# Patient Record
Sex: FEMALE | Race: WHITE | NOT HISPANIC OR LATINO | Employment: OTHER | URBAN - METROPOLITAN AREA
[De-identification: names, ages, dates, MRNs, and addresses within clinical notes are randomized per-mention and may not be internally consistent; named-entity substitution may affect disease eponyms.]

---

## 2019-09-11 ENCOUNTER — OFFICE VISIT (OUTPATIENT)
Dept: FAMILY MEDICINE CLINIC | Facility: CLINIC | Age: 84
End: 2019-09-11

## 2019-09-11 VITALS
DIASTOLIC BLOOD PRESSURE: 82 MMHG | HEART RATE: 76 BPM | OXYGEN SATURATION: 96 % | SYSTOLIC BLOOD PRESSURE: 140 MMHG | TEMPERATURE: 97.9 F | WEIGHT: 144.6 LBS | BODY MASS INDEX: 25.62 KG/M2 | HEIGHT: 63 IN

## 2019-09-11 DIAGNOSIS — R53.83 FATIGUE, UNSPECIFIED TYPE: ICD-10-CM

## 2019-09-11 DIAGNOSIS — E53.8 VITAMIN B12 DEFICIENCY: ICD-10-CM

## 2019-09-11 DIAGNOSIS — G89.29 OTHER CHRONIC PAIN: Primary | ICD-10-CM

## 2019-09-11 DIAGNOSIS — Z79.899 HIGH RISK MEDICATION USE: ICD-10-CM

## 2019-09-11 DIAGNOSIS — I10 ESSENTIAL HYPERTENSION: ICD-10-CM

## 2019-09-11 PROCEDURE — 99214 OFFICE O/P EST MOD 30 MIN: CPT | Performed by: FAMILY MEDICINE

## 2019-09-11 RX ORDER — ERYTHROMYCIN 5 MG/G
OINTMENT OPHTHALMIC
Refills: 3 | COMMUNITY
Start: 2019-08-03 | End: 2020-01-01

## 2019-09-11 RX ORDER — PANTOPRAZOLE SODIUM 40 MG/1
40 TABLET, DELAYED RELEASE ORAL DAILY
Refills: 5 | COMMUNITY
Start: 2019-09-05 | End: 2020-01-01 | Stop reason: SDUPTHER

## 2019-09-11 RX ORDER — PROPRANOLOL HYDROCHLORIDE 10 MG/1
5 TABLET ORAL 2 TIMES DAILY
COMMUNITY
End: 2019-09-11 | Stop reason: SINTOL

## 2019-09-11 RX ORDER — HYDROCODONE BITARTRATE AND ACETAMINOPHEN 7.5; 325 MG/1; MG/1
1 TABLET ORAL EVERY 6 HOURS PRN
Refills: 0 | COMMUNITY
Start: 2019-08-09 | End: 2019-09-11 | Stop reason: SDUPTHER

## 2019-09-11 RX ORDER — HYDROCODONE BITARTRATE AND ACETAMINOPHEN 7.5; 325 MG/1; MG/1
1 TABLET ORAL EVERY 6 HOURS PRN
Qty: 120 TABLET | Refills: 0 | Status: SHIPPED | OUTPATIENT
Start: 2019-09-11 | End: 2019-10-10 | Stop reason: SDUPTHER

## 2019-09-11 RX ORDER — METOPROLOL SUCCINATE 25 MG/1
12.5 TABLET, EXTENDED RELEASE ORAL DAILY
Qty: 45 TABLET | Refills: 3 | Status: SHIPPED | OUTPATIENT
Start: 2019-09-11 | End: 2019-12-16 | Stop reason: SDUPTHER

## 2019-09-11 NOTE — PROGRESS NOTES
Subjective   Ivis Yin is a 83 y.o. female.     Chief Complaint   Patient presents with   • Pain   • Heartburn     wants PCP to take over meds instead og GI        Patient doing well with her chronic pain management.  She gets through the day.  The propanolol did not help her tremor at the higher dose and it just makes her sleepy.  They are wondering about trying something else for her blood pressure.  She denies any chest pain or shortness of breath.  She does sleep a lot but can stay awake for visits.  She is meeting her ADLs and just needs some help at home.           The following portions of the patient's history were reviewed and updated as appropriate: allergies, current medications, past family history, past medical history, past social history, past surgical history and problem list.    Past Medical History:   Diagnosis Date   • Abdominal pain    • Abnormal blood chemistry    • Abnormal creatine kinase level    • Abnormal thyroid screen (blood)    • Adjustment disorder with mixed anxiety and depressed mood    • Anxiety disorder    • Arthritis    • Benign familial tremor    • BMI 27.0-27.9,adult    • Cervical stenosis of spine    • Chronic pain    • Constipation    • COPD (chronic obstructive pulmonary disease) (CMS/HCC)    • Depression    • Dermatitis    • Dermatophytosis    • Dizziness    • Dyspnea    • Edema    • Elevated hematocrit    • Encounter for long-term (current) use of medications    • Esophageal reflux    • Extremity atherosclerosis with intermittent claudication (CMS/HCC)    • Fatigue    • Generalized osteoarthritis    • Hyperlipidemia    • Hypertension    • Hypokalemia    • Hypothyroidism    • Impacted cerumen    • Insomnia    • Limb pain    • Limb swelling    • Loss of weight    • Mild renal insufficiency    • Mumps    • Muscle cramp    • Muscle weakness    • Nausea    • Osteoporosis    • Peripheral vertigo    • Seborrheic keratosis    • Statin myopathy    • Tinnitus    • Tobacco use     • Tremor    • Varicella    • Vitamin B12 deficiency    • Vitamin D deficiency    • Weakness        Past Surgical History:   Procedure Laterality Date   • APPENDECTOMY  1959   • BLADDER SURGERY  1985    bladder repair   • CATARACT EXTRACTION     • COLONOSCOPY  2007   • HERNIA REPAIR      1995, 2000, 2005, 2010, 2/2012   • HYSTERECTOMY     • INGUINAL HERNIA REPAIR     • SHOULDER SURGERY  08/28/2013       Family History   Problem Relation Age of Onset   • Emphysema Mother    • COPD Mother    • Osteoporosis Mother    • Cancer Father        Social History     Socioeconomic History   • Marital status:      Spouse name: Not on file   • Number of children: Not on file   • Years of education: Not on file   • Highest education level: Not on file   Tobacco Use   • Smoking status: Current Every Day Smoker   • Smokeless tobacco: Current User   • Tobacco comment: smokes 1-2 PPD for 30 years (10/4/18)   Substance and Sexual Activity   • Alcohol use: No     Frequency: Never   • Drug use: No         Current Outpatient Medications:   •  erythromycin (ROMYCIN) 5 MG/GM ophthalmic ointment, ONE APPLICATION IN THE RIGHT EYE IN THE EVENING, Disp: , Rfl: 3  •  HYDROcodone-acetaminophen (NORCO) 7.5-325 MG per tablet, Take 1 tablet by mouth Every 6 (Six) Hours As Needed for Moderate Pain  or Severe Pain . for pain, Disp: 120 tablet, Rfl: 0  •  pantoprazole (PROTONIX) 40 MG EC tablet, Take 40 mg by mouth Daily., Disp: , Rfl: 5  •  metoprolol succinate XL (TOPROL XL) 25 MG 24 hr tablet, Take 0.5 tablets by mouth Daily., Disp: 45 tablet, Rfl: 3    Review of Systems   Constitutional: Negative for chills, fatigue and fever.   HENT: Negative for congestion, rhinorrhea and sore throat.    Respiratory: Negative for cough and shortness of breath.    Cardiovascular: Negative for chest pain and leg swelling.   Gastrointestinal: Negative for abdominal pain.   Endocrine: Negative for polydipsia and polyuria.   Genitourinary: Negative for  "dysuria.   Musculoskeletal: Positive for arthralgias and back pain. Negative for myalgias.   Skin: Negative for rash.   Neurological: Negative for dizziness.   Hematological: Does not bruise/bleed easily.   Psychiatric/Behavioral: Negative for sleep disturbance.       Objective   Vitals:    09/11/19 1443   BP: 140/82   Pulse: 76   Temp: 97.9 °F (36.6 °C)   SpO2: 96%   Weight: 65.6 kg (144 lb 9.6 oz)   Height: 160 cm (63\")     Body mass index is 25.61 kg/m².  Physical Exam   Constitutional: She is oriented to person, place, and time. She appears well-developed and well-nourished.   HENT:   Head: Normocephalic and atraumatic.   Eyes: Conjunctivae and EOM are normal. Pupils are equal, round, and reactive to light.   Neck: Neck supple. No thyromegaly present.   Cardiovascular: Normal rate and regular rhythm.   No murmur heard.  Pulmonary/Chest: Effort normal and breath sounds normal. She has no wheezes.   Abdominal: Soft.   Musculoskeletal: Normal range of motion.   Neurological: She is alert and oriented to person, place, and time. No cranial nerve deficit.   Tremor noted   Skin: Skin is warm and dry.   Psychiatric: She has a normal mood and affect.         Assessment/Plan   Ivis was seen today for pain and heartburn.    Diagnoses and all orders for this visit:    Other chronic pain    Essential hypertension  -     metoprolol succinate XL (TOPROL XL) 25 MG 24 hr tablet; Take 0.5 tablets by mouth Daily.    High risk medication use  -     Drug Profile [id number] - Urine, Clean Catch    Vitamin B12 deficiency  -     Vitamin B12    Fatigue, unspecified type  -     CBC & Differential  -     Comprehensive Metabolic Panel  -     TSH    Other orders  -     HYDROcodone-acetaminophen (NORCO) 7.5-325 MG per tablet; Take 1 tablet by mouth Every 6 (Six) Hours As Needed for Moderate Pain  or Severe Pain . for pain               There are no Patient Instructions on file for this visit.  "

## 2019-09-12 LAB
ALBUMIN SERPL-MCNC: 4.4 G/DL (ref 3.5–4.7)
ALBUMIN/GLOB SERPL: 2 {RATIO} (ref 1.2–2.2)
ALP SERPL-CCNC: 81 IU/L (ref 39–117)
ALT SERPL-CCNC: 5 IU/L (ref 0–32)
AST SERPL-CCNC: 6 IU/L (ref 0–40)
BASOPHILS # BLD AUTO: 0 X10E3/UL (ref 0–0.2)
BASOPHILS NFR BLD AUTO: 1 %
BILIRUB SERPL-MCNC: 0.3 MG/DL (ref 0–1.2)
BUN SERPL-MCNC: 16 MG/DL (ref 8–27)
BUN/CREAT SERPL: 14 (ref 12–28)
CALCIUM SERPL-MCNC: 9.3 MG/DL (ref 8.7–10.3)
CHLORIDE SERPL-SCNC: 103 MMOL/L (ref 96–106)
CO2 SERPL-SCNC: 26 MMOL/L (ref 20–29)
CREAT SERPL-MCNC: 1.12 MG/DL (ref 0.57–1)
EOSINOPHIL # BLD AUTO: 0.3 X10E3/UL (ref 0–0.4)
EOSINOPHIL NFR BLD AUTO: 5 %
ERYTHROCYTE [DISTWIDTH] IN BLOOD BY AUTOMATED COUNT: 13.5 % (ref 12.3–15.4)
GLOBULIN SER CALC-MCNC: 2.2 G/DL (ref 1.5–4.5)
GLUCOSE SERPL-MCNC: 105 MG/DL (ref 65–99)
HCT VFR BLD AUTO: 46.1 % (ref 34–46.6)
HGB BLD-MCNC: 15.5 G/DL (ref 11.1–15.9)
IMM GRANULOCYTES # BLD AUTO: 0 X10E3/UL (ref 0–0.1)
IMM GRANULOCYTES NFR BLD AUTO: 0 %
LYMPHOCYTES # BLD AUTO: 2.3 X10E3/UL (ref 0.7–3.1)
LYMPHOCYTES NFR BLD AUTO: 32 %
MCH RBC QN AUTO: 31.4 PG (ref 26.6–33)
MCHC RBC AUTO-ENTMCNC: 33.6 G/DL (ref 31.5–35.7)
MCV RBC AUTO: 93 FL (ref 79–97)
MONOCYTES # BLD AUTO: 0.4 X10E3/UL (ref 0.1–0.9)
MONOCYTES NFR BLD AUTO: 6 %
NEUTROPHILS # BLD AUTO: 4.1 X10E3/UL (ref 1.4–7)
NEUTROPHILS NFR BLD AUTO: 56 %
PLATELET # BLD AUTO: 212 X10E3/UL (ref 150–450)
POTASSIUM SERPL-SCNC: 4.3 MMOL/L (ref 3.5–5.2)
PROT SERPL-MCNC: 6.6 G/DL (ref 6–8.5)
RBC # BLD AUTO: 4.94 X10E6/UL (ref 3.77–5.28)
SODIUM SERPL-SCNC: 146 MMOL/L (ref 134–144)
TSH SERPL DL<=0.005 MIU/L-ACNC: 3.95 UIU/ML (ref 0.45–4.5)
VIT B12 SERPL-MCNC: 526 PG/ML (ref 232–1245)
WBC # BLD AUTO: 7.1 X10E3/UL (ref 3.4–10.8)

## 2019-09-13 LAB
AMPHETAMINES UR QL SCN: NEGATIVE NG/ML
BARBITURATES UR QL SCN: NEGATIVE NG/ML
BENZODIAZ UR QL SCN: NEGATIVE NG/ML
BZE UR QL SCN: NEGATIVE NG/ML
CANNABINOIDS UR QL SCN: NEGATIVE NG/ML
CREAT UR-MCNC: 182.4 MG/DL (ref 20–300)
LABORATORY COMMENT REPORT: ABNORMAL
METHADONE UR QL SCN: NEGATIVE NG/ML
OPIATES UR QL SCN: POSITIVE NG/ML
OXYCODONE+OXYMORPHONE UR QL SCN: NEGATIVE NG/ML
PCP UR QL: NEGATIVE NG/ML
PH UR: 5.7 [PH] (ref 4.5–8.9)
PROPOXYPH UR QL SCN: NEGATIVE NG/ML

## 2019-10-10 ENCOUNTER — TELEPHONE (OUTPATIENT)
Dept: FAMILY MEDICINE CLINIC | Facility: CLINIC | Age: 84
End: 2019-10-10

## 2019-10-10 DIAGNOSIS — G89.29 OTHER CHRONIC PAIN: Primary | ICD-10-CM

## 2019-10-10 RX ORDER — HYDROCODONE BITARTRATE AND ACETAMINOPHEN 7.5; 325 MG/1; MG/1
1 TABLET ORAL EVERY 6 HOURS PRN
Qty: 120 TABLET | Refills: 0 | Status: SHIPPED | OUTPATIENT
Start: 2019-10-10 | End: 2019-10-15 | Stop reason: SDUPTHER

## 2019-10-10 NOTE — TELEPHONE ENCOUNTER
Needs a refill on hydrocodone, everything is up to date. She has been seen at this office. I didn't pend the medication.

## 2019-10-11 ENCOUNTER — TELEPHONE (OUTPATIENT)
Dept: FAMILY MEDICINE CLINIC | Facility: CLINIC | Age: 84
End: 2019-10-11

## 2019-10-11 DIAGNOSIS — G89.29 OTHER CHRONIC PAIN: ICD-10-CM

## 2019-10-14 NOTE — TELEPHONE ENCOUNTER
I can not figure out how to send in medication from the [facility] but i can co-sign them. OK for refill.

## 2019-10-15 RX ORDER — HYDROCODONE BITARTRATE AND ACETAMINOPHEN 7.5; 325 MG/1; MG/1
1 TABLET ORAL EVERY 6 HOURS PRN
Qty: 120 TABLET | Refills: 0 | Status: SHIPPED | OUTPATIENT
Start: 2019-10-15 | End: 2019-11-15 | Stop reason: SDUPTHER

## 2019-10-16 RX ORDER — PROPRANOLOL HYDROCHLORIDE 10 MG/1
TABLET ORAL
Qty: 60 TABLET | Refills: 0 | OUTPATIENT
Start: 2019-10-16

## 2019-10-16 NOTE — TELEPHONE ENCOUNTER
Clarify.  I see metoprolol in the patient's medicine list.  She should only be taking 1 beta-blocker.

## 2019-10-16 NOTE — TELEPHONE ENCOUNTER
I think maybe you just started the metoprolol at the recent visit. But it was started for HTN, in the note it states the propanolol was for tremor? Please advise.

## 2019-10-23 ENCOUNTER — OFFICE VISIT (OUTPATIENT)
Dept: FAMILY MEDICINE CLINIC | Facility: CLINIC | Age: 84
End: 2019-10-23

## 2019-10-23 VITALS
SYSTOLIC BLOOD PRESSURE: 128 MMHG | DIASTOLIC BLOOD PRESSURE: 76 MMHG | HEART RATE: 80 BPM | TEMPERATURE: 98.1 F | OXYGEN SATURATION: 97 % | BODY MASS INDEX: 25.16 KG/M2 | HEIGHT: 63 IN | WEIGHT: 142 LBS

## 2019-10-23 DIAGNOSIS — R30.0 DYSURIA: Primary | ICD-10-CM

## 2019-10-23 DIAGNOSIS — R41.0 DISORIENTATION: ICD-10-CM

## 2019-10-23 DIAGNOSIS — N30.00 ACUTE CYSTITIS WITHOUT HEMATURIA: ICD-10-CM
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
## 2019-10-23 LAB
BILIRUB BLD-MCNC: NEGATIVE MG/DL
GLUCOSE UR STRIP-MCNC: NEGATIVE MG/DL
KETONES UR QL: NEGATIVE
LEUKOCYTE EST, POC: ABNORMAL
NITRITE UR-MCNC: NEGATIVE MG/ML
PH UR: 6 [PH] (ref 5–8)
PROT UR STRIP-MCNC: ABNORMAL MG/DL
RBC # UR STRIP: NEGATIVE /UL
SP GR UR: 1.02 (ref 1–1.03)
UROBILINOGEN UR QL: NORMAL

## 2019-10-23 PROCEDURE — 81003 URINALYSIS AUTO W/O SCOPE: CPT | Performed by: NURSE PRACTITIONER

## 2019-10-23 PROCEDURE — 99213 OFFICE O/P EST LOW 20 MIN: CPT | Performed by: NURSE PRACTITIONER

## 2019-10-23 RX ORDER — CEPHALEXIN 500 MG/1
500 CAPSULE ORAL 2 TIMES DAILY
Qty: 14 CAPSULE | Refills: 0 | Status: SHIPPED | OUTPATIENT
Start: 2019-10-23 | End: 2019-12-16

## 2019-10-23 RX ORDER — PROPRANOLOL HYDROCHLORIDE 10 MG/1
10 TABLET ORAL 2 TIMES DAILY
Refills: 1 | COMMUNITY
Start: 2019-10-16 | End: 2019-10-25

## 2019-10-23 NOTE — PROGRESS NOTES
Subjective   Ivis Yin is a 83 y.o. female.     Chief Complaint   Patient presents with   • Altered Mental Status   • Fall     fell 10/13, layed on the floor for 2 days        History of Present Illness       Fell the Sunday before last.  Patient states she laid for about a day and half before she could get up.  Doesn't remember how she got up.     Since then daughter states she has been having a lot more memory problems.  Has forgotten routine things that she wasn't having trouble with this previously.   L arm swelling.    Has chronic back pain, but no worse than normal.    Didn't go to ER after visit.    Has had increase in dizziness since visit.    Generally doesn't feel well.    Denies any changes in vision    Denies any difficulty with urination.      The following portions of the patient's history were reviewed and updated as appropriate: allergies, current medications, past family history, past medical history, past social history, past surgical history and problem list.    Past Medical History:   Diagnosis Date   • Abdominal pain    • Abnormal blood chemistry    • Abnormal creatine kinase level    • Abnormal thyroid screen (blood)    • Adjustment disorder with mixed anxiety and depressed mood    • Anxiety disorder    • Arthritis    • Benign familial tremor    • BMI 27.0-27.9,adult    • Cervical stenosis of spine    • Chronic pain    • Constipation    • COPD (chronic obstructive pulmonary disease) (CMS/HCC)    • Depression    • Dermatitis    • Dermatophytosis    • Dizziness    • Dyspnea    • Edema    • Elevated hematocrit    • Encounter for long-term (current) use of medications    • Esophageal reflux    • Extremity atherosclerosis with intermittent claudication (CMS/HCC)    • Fatigue    • Generalized osteoarthritis    • Hyperlipidemia    • Hypertension    • Hypokalemia    • Hypothyroidism    • Impacted cerumen    • Insomnia    • Limb pain    • Limb swelling    • Loss of weight    • Mild renal  insufficiency    • Mumps    • Muscle cramp    • Muscle weakness    • Nausea    • Osteoporosis    • Peripheral vertigo    • Seborrheic keratosis    • Statin myopathy    • Tinnitus    • Tobacco use    • Tremor    • Varicella    • Vitamin B12 deficiency    • Vitamin D deficiency    • Weakness        Past Surgical History:   Procedure Laterality Date   • APPENDECTOMY  1959   • BLADDER SURGERY  1985    bladder repair   • CATARACT EXTRACTION     • COLONOSCOPY  2007   • HERNIA REPAIR      1995, 2000, 2005, 2010, 2/2012   • HYSTERECTOMY     • INGUINAL HERNIA REPAIR     • SHOULDER SURGERY  08/28/2013       Family History   Problem Relation Age of Onset   • Emphysema Mother    • COPD Mother    • Osteoporosis Mother    • Cancer Father        Social History     Socioeconomic History   • Marital status:      Spouse name: Not on file   • Number of children: Not on file   • Years of education: Not on file   • Highest education level: Not on file   Tobacco Use   • Smoking status: Current Every Day Smoker   • Smokeless tobacco: Current User   • Tobacco comment: smokes 1-2 PPD for 30 years (10/4/18)   Substance and Sexual Activity   • Alcohol use: No     Frequency: Never   • Drug use: No         Current Outpatient Medications:   •  erythromycin (ROMYCIN) 5 MG/GM ophthalmic ointment, ONE APPLICATION IN THE RIGHT EYE IN THE EVENING, Disp: , Rfl: 3  •  HYDROcodone-acetaminophen (NORCO) 7.5-325 MG per tablet, Take 1 tablet by mouth Every 6 (Six) Hours As Needed for Moderate Pain  or Severe Pain . for pain, Disp: 120 tablet, Rfl: 0  •  metoprolol succinate XL (TOPROL XL) 25 MG 24 hr tablet, Take 0.5 tablets by mouth Daily., Disp: 45 tablet, Rfl: 3  •  propranolol (INDERAL) 10 MG tablet, Take 10 mg by mouth 2 (Two) Times a Day., Disp: , Rfl: 1  •  cephalexin (KEFLEX) 500 MG capsule, Take 1 capsule by mouth 2 (Two) Times a Day., Disp: 14 capsule, Rfl: 0  •  pantoprazole (PROTONIX) 40 MG EC tablet, Take 40 mg by mouth Daily., Disp: ,  "Rfl: 5    Review of Systems   Constitutional: Positive for fatigue. Negative for fever.   HENT: Negative for ear pain, rhinorrhea, sinus pressure and sore throat.    Respiratory: Negative for cough, shortness of breath and wheezing.    Cardiovascular: Negative for chest pain.   Gastrointestinal: Negative for abdominal pain, constipation, diarrhea, nausea and vomiting.   Genitourinary: Negative for dysuria and urgency.   Neurological: Positive for dizziness, weakness, memory problem and confusion. Negative for tremors, seizures, syncope, speech difficulty and headache.   Hematological: Does not bruise/bleed easily.   Psychiatric/Behavioral: Negative for suicidal ideas and depressed mood. The patient is not nervous/anxious.        Objective   Vitals:    10/23/19 1419   BP: 128/76   Pulse: 80   Temp: 98.1 °F (36.7 °C)   SpO2: 97%   Weight: 64.4 kg (142 lb)   Height: 160 cm (63\")     Body mass index is 25.15 kg/m².  Physical Exam   Constitutional: She is oriented to person, place, and time. She appears well-developed and well-nourished.   Eyes: Conjunctivae, EOM and lids are normal. Pupils are equal, round, and reactive to light.   Cardiovascular: Normal rate, regular rhythm and normal heart sounds.   Pulmonary/Chest: Effort normal and breath sounds normal.   Neurological: She is alert and oriented to person, place, and time. She has normal strength. She displays no tremor. No cranial nerve deficit. She displays a negative Romberg sign. GCS eye subscore is 4. GCS verbal subscore is 5. GCS motor subscore is 6.   Psychiatric: Her speech is normal and behavior is normal. Judgment and thought content normal. Her mood appears not anxious. Cognition and memory are impaired. She does not exhibit a depressed mood.         Assessment/Plan   Ivis was seen today for altered mental status and fall.    Diagnoses and all orders for this visit:    Dysuria  -     POC Urinalysis Dipstick, Automated    Acute cystitis without " hematuria  -     CBC & Differential  -     Comprehensive metabolic panel  -     Urine Culture - Urine, Urine, Clean Catch    Fall, initial encounter  -     CBC & Differential  -     Comprehensive metabolic panel  -     Urine Culture - Urine, Urine, Clean Catch    Disorientation  -     CBC & Differential  -     Comprehensive metabolic panel  -     Urine Culture - Urine, Urine, Clean Catch    Other orders  -     cephalexin (KEFLEX) 500 MG capsule; Take 1 capsule by mouth 2 (Two) Times a Day.      Patient was not more weak on the right side during neurological exam.  Discussed this with her daughter.  Discussed that UTI can cause confusion and will start antibiotic and then follow-up if no improvement in confusion.  Also discussed that her kidney function could have been affected by her fall long-term on the ground.  We will check labs today and call with results tomorrow.    Discussed with patient she is to keep her Lifeline on her at all times.  Also discussed risks of continuing Norco and how this can be affecting her mental status as well.           Patient Instructions   Urinary Tract Infection, Adult  A urinary tract infection (UTI) is an infection of any part of the urinary tract. The urinary tract includes the:  · Kidneys.  · Ureters.  · Bladder.  · Urethra.  These organs make, store, and get rid of pee (urine) in the body.  Follow these instructions at home:    · Take over-the-counter and prescription medicines only as told by your doctor.  · If you were prescribed an antibiotic medicine, take it as told by your doctor. Do not stop taking it even if you start to feel better.  · Drink enough fluid to keep your pee (urine) pale yellow. For most people, this is 6-10 glasses of water each day.  · Keep all follow-up visits as told by your doctor. This is important.  · Make sure you:  ? Empty your bladder often and completely. Do not hold pee for long periods of time.  ? Empty your bladder after sex.  ? Wipe from  front to back after a bowel movement if you are female. Use each tissue one time when you wipe.  Contact a doctor if:  · Your symptoms do not get better after 1-2 days.  · Your symptoms go away and then come back.  Get help right away if:  · You have very bad pain in your back.  · You have very bad pain in your lower belly (abdomen).  · You have a fever.  · You are sick to your stomach (nauseous).  · You are throwing up (vomiting).  Summary  · A urinary tract infection (UTI) is an infection of any part of the urinary tract.  · If you were prescribed an antibiotic medicine, take it as told by your doctor. Do not stop taking it even if you start to feel better.  · Drink enough fluid to keep your pee (urine) pale yellow.  This information is not intended to replace advice given to you by your health care provider. Make sure you discuss any questions you have with your health care provider.  Document Released: 06/05/2009 Document Revised: 02/12/2019 Document Reviewed: 11/07/2016  [employer] Interactive Patient Education © 2019 [employer]

## 2019-10-23 NOTE — PATIENT INSTRUCTIONS
Urinary Tract Infection, Adult  A urinary tract infection (UTI) is an infection of any part of the urinary tract. The urinary tract includes the:  · Kidneys.  · Ureters.  · Bladder.  · Urethra.  These organs make, store, and get rid of pee (urine) in the body.  Follow these instructions at home:    · Take over-the-counter and prescription medicines only as told by your doctor.  · If you were prescribed an antibiotic medicine, take it as told by your doctor. Do not stop taking it even if you start to feel better.  · Drink enough fluid to keep your pee (urine) pale yellow. For most people, this is 6-10 glasses of water each day.  · Keep all follow-up visits as told by your doctor. This is important.  · Make sure you:  ? Empty your bladder often and completely. Do not hold pee for long periods of time.  ? Empty your bladder after sex.  ? Wipe from front to back after a bowel movement if you are female. Use each tissue one time when you wipe.  Contact a doctor if:  · Your symptoms do not get better after 1-2 days.  · Your symptoms go away and then come back.  Get help right away if:  · You have very bad pain in your back.  · You have very bad pain in your lower belly (abdomen).  · You have a fever.  · You are sick to your stomach (nauseous).  · You are throwing up (vomiting).  Summary  · A urinary tract infection (UTI) is an infection of any part of the urinary tract.  · If you were prescribed an antibiotic medicine, take it as told by your doctor. Do not stop taking it even if you start to feel better.  · Drink enough fluid to keep your pee (urine) pale yellow.  This information is not intended to replace advice given to you by your health care provider. Make sure you discuss any questions you have with your health care provider.  Document Released: 06/05/2009 Document Revised: 02/12/2019 Document Reviewed: 11/07/2016  [employer] Interactive Patient Education © 2019 [employer]

## 2019-10-25 LAB
ALBUMIN SERPL-MCNC: 4.3 G/DL (ref 3.5–4.7)
ALBUMIN/GLOB SERPL: 2.3 {RATIO} (ref 1.2–2.2)
ALP SERPL-CCNC: 87 IU/L (ref 39–117)
ALT SERPL-CCNC: 11 IU/L (ref 0–32)
AST SERPL-CCNC: 16 IU/L (ref 0–40)
BACTERIA UR CULT: NORMAL
BACTERIA UR CULT: NORMAL
BASOPHILS # BLD AUTO: 0 X10E3/UL (ref 0–0.2)
BASOPHILS NFR BLD AUTO: 0 %
BILIRUB SERPL-MCNC: 0.6 MG/DL (ref 0–1.2)
BUN SERPL-MCNC: 19 MG/DL (ref 8–27)
BUN/CREAT SERPL: 19 (ref 12–28)
CALCIUM SERPL-MCNC: 9.2 MG/DL (ref 8.7–10.3)
CHLORIDE SERPL-SCNC: 102 MMOL/L (ref 96–106)
CO2 SERPL-SCNC: 26 MMOL/L (ref 20–29)
CREAT SERPL-MCNC: 1.01 MG/DL (ref 0.57–1)
EOSINOPHIL # BLD AUTO: 0.5 X10E3/UL (ref 0–0.4)
EOSINOPHIL NFR BLD AUTO: 5 %
ERYTHROCYTE [DISTWIDTH] IN BLOOD BY AUTOMATED COUNT: 13.7 % (ref 12.3–15.4)
GLOBULIN SER CALC-MCNC: 1.9 G/DL (ref 1.5–4.5)
GLUCOSE SERPL-MCNC: 94 MG/DL (ref 65–99)
HCT VFR BLD AUTO: 48.4 % (ref 34–46.6)
HGB BLD-MCNC: 16 G/DL (ref 11.1–15.9)
IMM GRANULOCYTES # BLD AUTO: 0 X10E3/UL (ref 0–0.1)
IMM GRANULOCYTES NFR BLD AUTO: 0 %
LYMPHOCYTES # BLD AUTO: 2.8 X10E3/UL (ref 0.7–3.1)
LYMPHOCYTES NFR BLD AUTO: 28 %
MCH RBC QN AUTO: 31.1 PG (ref 26.6–33)
MCHC RBC AUTO-ENTMCNC: 33.1 G/DL (ref 31.5–35.7)
MCV RBC AUTO: 94 FL (ref 79–97)
MONOCYTES # BLD AUTO: 0.7 X10E3/UL (ref 0.1–0.9)
MONOCYTES NFR BLD AUTO: 7 %
NEUTROPHILS # BLD AUTO: 5.9 X10E3/UL (ref 1.4–7)
NEUTROPHILS NFR BLD AUTO: 60 %
PLATELET # BLD AUTO: 272 X10E3/UL (ref 150–450)
POTASSIUM SERPL-SCNC: 4.4 MMOL/L (ref 3.5–5.2)
PROT SERPL-MCNC: 6.2 G/DL (ref 6–8.5)
RBC # BLD AUTO: 5.14 X10E6/UL (ref 3.77–5.28)
SODIUM SERPL-SCNC: 142 MMOL/L (ref 134–144)
WBC # BLD AUTO: 9.9 X10E3/UL (ref 3.4–10.8)

## 2019-11-14 ENCOUNTER — TELEPHONE (OUTPATIENT)
Dept: FAMILY MEDICINE CLINIC | Facility: CLINIC | Age: 84
End: 2019-11-14

## 2019-11-14 DIAGNOSIS — G89.29 OTHER CHRONIC PAIN: ICD-10-CM

## 2019-11-15 RX ORDER — HYDROCODONE BITARTRATE AND ACETAMINOPHEN 7.5; 325 MG/1; MG/1
1 TABLET ORAL EVERY 6 HOURS PRN
Qty: 120 TABLET | Refills: 0 | Status: SHIPPED | OUTPATIENT
Start: 2019-11-15 | End: 2019-12-16 | Stop reason: SDUPTHER

## 2019-12-16 ENCOUNTER — OFFICE VISIT (OUTPATIENT)
Dept: FAMILY MEDICINE CLINIC | Facility: CLINIC | Age: 84
End: 2019-12-16

## 2019-12-16 VITALS
HEART RATE: 83 BPM | WEIGHT: 124.2 LBS | HEIGHT: 63 IN | BODY MASS INDEX: 22.01 KG/M2 | TEMPERATURE: 98 F | OXYGEN SATURATION: 97 % | DIASTOLIC BLOOD PRESSURE: 92 MMHG | SYSTOLIC BLOOD PRESSURE: 147 MMHG

## 2019-12-16 DIAGNOSIS — K59.03 DRUG-INDUCED CONSTIPATION: ICD-10-CM

## 2019-12-16 DIAGNOSIS — Z79.899 HIGH RISK MEDICATION USE: ICD-10-CM

## 2019-12-16 DIAGNOSIS — G89.29 OTHER CHRONIC PAIN: ICD-10-CM

## 2019-12-16 DIAGNOSIS — I10 ESSENTIAL HYPERTENSION: Primary | ICD-10-CM

## 2019-12-16 PROBLEM — K59.00 CONSTIPATION: Status: ACTIVE | Noted: 2019-12-16

## 2019-12-16 PROCEDURE — G0439 PPPS, SUBSEQ VISIT: HCPCS | Performed by: FAMILY MEDICINE

## 2019-12-16 RX ORDER — METOPROLOL SUCCINATE 50 MG/1
50 TABLET, EXTENDED RELEASE ORAL DAILY
Qty: 90 TABLET | Refills: 3 | Status: SHIPPED | OUTPATIENT
Start: 2019-12-16 | End: 2020-01-01

## 2019-12-16 RX ORDER — HYDROCODONE BITARTRATE AND ACETAMINOPHEN 7.5; 325 MG/1; MG/1
1 TABLET ORAL EVERY 6 HOURS PRN
Qty: 120 TABLET | Refills: 0 | Status: SHIPPED | OUTPATIENT
Start: 2019-12-16 | End: 2020-01-01 | Stop reason: SDUPTHER

## 2019-12-16 NOTE — PROGRESS NOTES
The ABCs of the Annual Wellness Visit  Subsequent Medicare Wellness Visit    Chief Complaint   Patient presents with   • Medicare Wellness-subsequent     courious, about linzess       Subjective   History of Present Illness:  Ivis Yin is a 84 y.o. female who presents for a Subsequent Medicare Wellness Visit.  Patient presents for her annual Medicare wellness.  She is doing fine.  Her blood pressure has been running higher since switching the metoprolol from propanolol.  Her pain is under control with her 4 hydrocodone daily.  She has had a lot of trouble with constipation lately.  She responded to Linzess in the past.  She want to try it again.    HEALTH RISK ASSESSMENT    Recent Hospitalizations:  No hospitalization(s) within the last year.    Current Medical Providers:  Patient Care Team:  Kehrer, Meredith Lea, MD as PCP - General (Family Medicine)    Smoking Status:  Social History     Tobacco Use   Smoking Status Current Every Day Smoker   Smokeless Tobacco Current User   Tobacco Comment    smokes 1-2 PPD for 30 years (10/4/18)       Alcohol Consumption:  Social History     Substance and Sexual Activity   Alcohol Use No   • Frequency: Never       Depression Screen:   PHQ-2/PHQ-9 Depression Screening 12/16/2019   Little interest or pleasure in doing things 0   Feeling down, depressed, or hopeless 2   Trouble falling or staying asleep, or sleeping too much 2   Feeling tired or having little energy 3   Poor appetite or overeating 3   Feeling bad about yourself - or that you are a failure or have let yourself or your family down 0   Trouble concentrating on things, such as reading the newspaper or watching television 2   Moving or speaking so slowly that other people could have noticed. Or the opposite - being so fidgety or restless that you have been moving around a lot more than usual 3   Thoughts that you would be better off dead, or of hurting yourself in some way 0   Total Score 15       Fall Risk  Screen:  VICK Fall Risk Assessment was completed, and patient is at HIGH risk for falls. Assessment completed on:12/16/2019    Health Habits and Functional and Cognitive Screening:  Functional & Cognitive Status 12/16/2019   Do you have difficulty preparing food and eating? Yes   Do you have difficulty bathing yourself, getting dressed or grooming yourself? Yes   Do you have difficulty using the toilet? No   Do you have difficulty moving around from place to place? Yes   Do you have trouble with steps or getting out of a bed or a chair? Yes   Current Diet Frequent Junk Food   Dental Exam Not up to date   Eye Exam Up to date   Exercise (times per week) 0 times per week   Current Exercise Activities Include None   Do you need help using the phone?  No   Are you deaf or do you have serious difficulty hearing?  Yes   Do you need help with transportation? Yes   Do you need help shopping? Yes   Do you need help preparing meals?  No   Do you need help with housework?  Yes   Do you need help with laundry? Yes   Do you need help taking your medications? Yes   Do you need help managing money? Yes   Do you ever drive or ride in a car without wearing a seat belt? No   Have you felt unusual stress, anger or loneliness in the last month? Yes   Who do you live with? Alone   If you need help, do you have trouble finding someone available to you? No   Have you been bothered in the last four weeks by sexual problems? No         Does the patient have evidence of cognitive impairment? No    Asprin use counseling:Does not need ASA (and currently is not on it)    Age-appropriate Screening Schedule:  Refer to the list below for future screening recommendations based on patient's age, sex and/or medical conditions. Orders for these recommended tests are listed in the plan section. The patient has been provided with a written plan.    Health Maintenance   Topic Date Due   • TDAP/TD VACCINES (1 - Tdap) 11/02/1946   • ZOSTER VACCINE (1 of  2) 11/02/1985   • PNEUMOCOCCAL VACCINES (65+ LOW/MEDIUM RISK) (1 of 2 - PCV13) 11/02/2000   • INFLUENZA VACCINE  08/01/2019   • LIPID PANEL  09/11/2019   • DXA SCAN  09/11/2019          The following portions of the patient's history were reviewed and updated as appropriate: allergies, current medications, past family history, past medical history, past social history, past surgical history and problem list.    Outpatient Medications Prior to Visit   Medication Sig Dispense Refill   • erythromycin (ROMYCIN) 5 MG/GM ophthalmic ointment ONE APPLICATION IN THE RIGHT EYE IN THE EVENING  3   • pantoprazole (PROTONIX) 40 MG EC tablet Take 40 mg by mouth Daily.  5   • HYDROcodone-acetaminophen (NORCO) 7.5-325 MG per tablet Take 1 tablet by mouth Every 6 (Six) Hours As Needed for Moderate Pain  or Severe Pain . for pain 120 tablet 0   • metoprolol succinate XL (TOPROL XL) 25 MG 24 hr tablet Take 0.5 tablets by mouth Daily. 45 tablet 3   • cephalexin (KEFLEX) 500 MG capsule Take 1 capsule by mouth 2 (Two) Times a Day. 14 capsule 0     No facility-administered medications prior to visit.        Patient Active Problem List   Diagnosis   • Chronic pain   • Hypertension   • Vitamin B12 deficiency   • Constipation       Advanced Care Planning:  Patient has an advance directive - a copy has not been provided. Have asked the patient to send this to us to add to record    Review of Systems   Constitutional: Negative for chills, fatigue and fever.   HENT: Negative for congestion, rhinorrhea and sore throat.    Respiratory: Positive for shortness of breath. Negative for cough.    Cardiovascular: Negative for chest pain and leg swelling.   Gastrointestinal: Negative for abdominal pain.   Endocrine: Negative for polydipsia and polyuria.   Genitourinary: Negative for dysuria.   Musculoskeletal: Positive for arthralgias. Negative for myalgias.   Skin: Negative for rash.   Neurological: Negative for dizziness.   Hematological: Does not  "bruise/bleed easily.   Psychiatric/Behavioral: Negative for sleep disturbance.       Compared to one year ago, the patient feels her physical health is the same.  Compared to one year ago, the patient feels her mental health is the same.    Reviewed chart for potential of high risk medication in the elderly: yes  Reviewed chart for potential of harmful drug interactions in the elderly:yes    Objective         Vitals:    12/16/19 1428   BP: 147/92   Pulse: 83   Temp: 98 °F (36.7 °C)   SpO2: 97%   Weight: 56.3 kg (124 lb 3.2 oz)   Height: 160 cm (63\")       Body mass index is 22 kg/m².  Discussed the patient's BMI with her. The BMI is above average; no BMI management plan is appropriate..    Physical Exam   Constitutional: She is oriented to person, place, and time. She appears well-developed and well-nourished.   HENT:   Head: Normocephalic and atraumatic.   Mouth/Throat: Oropharynx is clear and moist.   Eyes: Pupils are equal, round, and reactive to light. EOM are normal.   Neck: Neck supple. No thyromegaly present.   Cardiovascular: Normal rate and regular rhythm.   No murmur heard.  Pulmonary/Chest: Effort normal. She has decreased breath sounds. She has no wheezes.   Abdominal: Soft. Bowel sounds are normal. There is no tenderness.   Musculoskeletal: Normal range of motion. She exhibits no edema.   Neurological: She is alert and oriented to person, place, and time.   Skin: Skin is warm and dry.   Psychiatric: She has a normal mood and affect.       Lab Results   Component Value Date    GLU 94 10/23/2019        Assessment/Plan   Medicare Risks and Personalized Health Plan  CMS Preventative Services Quick Reference  Depression/Dysphoria    The above risks/problems have been discussed with the patient.  Pertinent information has been shared with the patient in the After Visit Summary.  Follow up plans and orders are seen below in the Assessment/Plan Section.    Diagnoses and all orders for this visit:    1. " Essential hypertension (Primary)  -     metoprolol succinate XL (TOPROL XL) 50 MG 24 hr tablet; Take 1 tablet by mouth Daily.  Dispense: 90 tablet; Refill: 3    2. Other chronic pain  -     HYDROcodone-acetaminophen (NORCO) 7.5-325 MG per tablet; Take 1 tablet by mouth Every 6 (Six) Hours As Needed for Moderate Pain  or Severe Pain . for pain  Dispense: 120 tablet; Refill: 0    3. Drug-induced constipation    4. High risk medication use  -     Drug Profile 238211 - Urine, Clean Catch      Follow Up:  Return in about 3 months (around 3/16/2020).     An After Visit Summary and PPPS were given to the patient.

## 2019-12-16 NOTE — PATIENT INSTRUCTIONS
Constipation, Adult  Constipation is when a person has fewer bowel movements in a week than normal, has difficulty having a bowel movement, or has stools that are dry, hard, or larger than normal. Constipation may be caused by an underlying condition. It may become worse with age if a person takes certain medicines and does not take in enough fluids.  Follow these instructions at home:  Eating and drinking    · Eat foods that have a lot of fiber, such as fresh fruits and vegetables, whole grains, and beans.  · Limit foods that are high in fat, low in fiber, or overly processed, such as french fries, hamburgers, cookies, candies, and soda.  · Drink enough fluid to keep your urine clear or pale yellow.  General instructions  · Exercise regularly or as told by your health care provider.  · Go to the restroom when you have the urge to go. Do not hold it in.  · Take over-the-counter and prescription medicines only as told by your health care provider. These include any fiber supplements.  · Practice pelvic floor retraining exercises, such as deep breathing while relaxing the lower abdomen and pelvic floor relaxation during bowel movements.  · Watch your condition for any changes.  · Keep all follow-up visits as told by your health care provider. This is important.  Contact a health care provider if:  · You have pain that gets worse.  · You have a fever.  · You do not have a bowel movement after 4 days.  · You vomit.  · You are not hungry.  · You lose weight.  · You are bleeding from the anus.  · You have thin, pencil-like stools.  Get help right away if:  · You have a fever and your symptoms suddenly get worse.  · You leak stool or have blood in your stool.  · Your abdomen is bloated.  · You have severe pain in your abdomen.  · You feel dizzy or you faint.  This information is not intended to replace advice given to you by your health care provider. Make sure you discuss any questions you have with your health care  provider.  Document Released: 09/15/2005 Document Revised: 07/07/2017 Document Reviewed: 06/07/2017  [employer] Interactive Patient Education © 2019 [employer]      Let me know how the linzess works.

## 2019-12-20 LAB
AMPHETAMINES UR QL SCN: NEGATIVE NG/ML
BARBITURATES UR QL: NEGATIVE NG/ML
BENZODIAZ UR QL SCN: NEGATIVE NG/ML
BZE UR QL: NEGATIVE NG/ML
CANNABINOIDS UR QL SCN: NEGATIVE NG/ML
CREAT UR-MCNC: 129.1 MG/DL (ref 20–300)
ETHANOL UR-MCNC: NEGATIVE %
MEPERIDINE UR QL: NEGATIVE NG/ML
METHADONE UR QL: NEGATIVE NG/ML
OPIATES UR QL SCN: POSITIVE NG/ML
OXYCODONE+OXYMORPHONE UR QL SCN: NEGATIVE NG/ML
PCP UR QL: NEGATIVE NG/ML
PROPOXYPH UR QL: NEGATIVE NG/ML
TRAMADOL UR QL SCN: NEGATIVE NG/ML

## 2020-01-01 ENCOUNTER — TELEPHONE (OUTPATIENT)
Dept: FAMILY MEDICINE CLINIC | Facility: CLINIC | Age: 85
End: 2020-01-01

## 2020-01-01 ENCOUNTER — OFFICE VISIT (OUTPATIENT)
Dept: FAMILY MEDICINE CLINIC | Facility: CLINIC | Age: 85
End: 2020-01-01

## 2020-01-01 VITALS
TEMPERATURE: 97.1 F | WEIGHT: 148.6 LBS | HEART RATE: 78 BPM | BODY MASS INDEX: 26.33 KG/M2 | DIASTOLIC BLOOD PRESSURE: 82 MMHG | SYSTOLIC BLOOD PRESSURE: 126 MMHG | HEIGHT: 63 IN | OXYGEN SATURATION: 97 %

## 2020-01-01 VITALS
WEIGHT: 141 LBS | DIASTOLIC BLOOD PRESSURE: 80 MMHG | SYSTOLIC BLOOD PRESSURE: 124 MMHG | TEMPERATURE: 97.9 F | OXYGEN SATURATION: 94 % | BODY MASS INDEX: 24.98 KG/M2 | HEART RATE: 90 BPM | HEIGHT: 63 IN

## 2020-01-01 VITALS
SYSTOLIC BLOOD PRESSURE: 160 MMHG | HEART RATE: 78 BPM | HEIGHT: 63 IN | BODY MASS INDEX: 25.52 KG/M2 | WEIGHT: 144 LBS | TEMPERATURE: 97.1 F | DIASTOLIC BLOOD PRESSURE: 92 MMHG | OXYGEN SATURATION: 94 %

## 2020-01-01 VITALS
OXYGEN SATURATION: 98 % | WEIGHT: 143.4 LBS | BODY MASS INDEX: 25.41 KG/M2 | TEMPERATURE: 97.8 F | SYSTOLIC BLOOD PRESSURE: 122 MMHG | HEIGHT: 63 IN | HEART RATE: 59 BPM | DIASTOLIC BLOOD PRESSURE: 82 MMHG

## 2020-01-01 VITALS
BODY MASS INDEX: 25.62 KG/M2 | OXYGEN SATURATION: 97 % | HEART RATE: 62 BPM | SYSTOLIC BLOOD PRESSURE: 126 MMHG | HEIGHT: 63 IN | WEIGHT: 144.6 LBS | TEMPERATURE: 96.6 F | DIASTOLIC BLOOD PRESSURE: 74 MMHG

## 2020-01-01 DIAGNOSIS — R06.02 SHORTNESS OF BREATH: ICD-10-CM

## 2020-01-01 DIAGNOSIS — K21.9 GASTROESOPHAGEAL REFLUX DISEASE WITHOUT ESOPHAGITIS: ICD-10-CM

## 2020-01-01 DIAGNOSIS — R26.9 ABNORMALITY OF GAIT: ICD-10-CM

## 2020-01-01 DIAGNOSIS — R29.6 MULTIPLE FALLS: ICD-10-CM

## 2020-01-01 DIAGNOSIS — E53.8 VITAMIN B12 DEFICIENCY: ICD-10-CM

## 2020-01-01 DIAGNOSIS — R25.1 TREMOR: ICD-10-CM

## 2020-01-01 DIAGNOSIS — G89.29 OTHER CHRONIC PAIN: ICD-10-CM

## 2020-01-01 DIAGNOSIS — K59.03 DRUG-INDUCED CONSTIPATION: ICD-10-CM

## 2020-01-01 DIAGNOSIS — S00.93XA CONTUSION OF HEAD, UNSPECIFIED PART OF HEAD, INITIAL ENCOUNTER: ICD-10-CM

## 2020-01-01 DIAGNOSIS — I10 ESSENTIAL HYPERTENSION: Primary | ICD-10-CM

## 2020-01-01 DIAGNOSIS — Z23 FLU VACCINE NEED: Primary | ICD-10-CM

## 2020-01-01 DIAGNOSIS — Z79.899 HIGH RISK MEDICATION USE: ICD-10-CM

## 2020-01-01 DIAGNOSIS — J44.9 CHRONIC OBSTRUCTIVE PULMONARY DISEASE, UNSPECIFIED COPD TYPE (HCC): ICD-10-CM

## 2020-01-01 DIAGNOSIS — R53.1 WEAKNESS: ICD-10-CM

## 2020-01-01 DIAGNOSIS — I10 ESSENTIAL HYPERTENSION: ICD-10-CM

## 2020-01-01 DIAGNOSIS — R47.9 SPEECH DISTURBANCE, UNSPECIFIED TYPE: Primary | ICD-10-CM

## 2020-01-01 DIAGNOSIS — R60.9 EDEMA, UNSPECIFIED TYPE: ICD-10-CM

## 2020-01-01 DIAGNOSIS — Z91.89 AT RISK FOR CHANGE IN MENTAL STATUS: ICD-10-CM

## 2020-01-01 LAB
ALBUMIN SERPL-MCNC: 4.1 G/DL (ref 3.6–4.6)
ALBUMIN SERPL-MCNC: 4.1 G/DL (ref 3.6–4.6)
ALBUMIN SERPL-MCNC: 4.2 G/DL (ref 3.6–4.6)
ALBUMIN SERPL-MCNC: 4.4 G/DL (ref 3.6–4.6)
ALBUMIN/GLOB SERPL: 1.8 {RATIO} (ref 1.2–2.2)
ALBUMIN/GLOB SERPL: 1.9 {RATIO} (ref 1.2–2.2)
ALBUMIN/GLOB SERPL: 2 {RATIO} (ref 1.2–2.2)
ALBUMIN/GLOB SERPL: 2.3 {RATIO} (ref 1.2–2.2)
ALP SERPL-CCNC: 104 IU/L (ref 39–117)
ALP SERPL-CCNC: 86 IU/L (ref 39–117)
ALT SERPL-CCNC: 12 IU/L (ref 0–32)
ALT SERPL-CCNC: 14 IU/L (ref 0–32)
ALT SERPL-CCNC: 20 IU/L (ref 0–32)
ALT SERPL-CCNC: 22 IU/L (ref 0–32)
AMPHETAMINES UR QL SCN: NEGATIVE NG/ML
AST SERPL-CCNC: 14 IU/L (ref 0–40)
AST SERPL-CCNC: 16 IU/L (ref 0–40)
AST SERPL-CCNC: 18 IU/L (ref 0–40)
AST SERPL-CCNC: 9 IU/L (ref 0–40)
BACTERIA UR CULT: NO GROWTH
BACTERIA UR CULT: NORMAL
BARBITURATES UR QL: NEGATIVE NG/ML
BASOPHILS # BLD AUTO: 0 X10E3/UL (ref 0–0.2)
BASOPHILS # BLD AUTO: 0.1 X10E3/UL (ref 0–0.2)
BASOPHILS NFR BLD AUTO: 0 %
BASOPHILS NFR BLD AUTO: 1 %
BENZODIAZ UR QL SCN: NEGATIVE NG/ML
BILIRUB BLD-MCNC: NEGATIVE MG/DL
BILIRUB BLD-MCNC: NEGATIVE MG/DL
BILIRUB SERPL-MCNC: 0.3 MG/DL (ref 0–1.2)
BILIRUB SERPL-MCNC: 0.5 MG/DL (ref 0–1.2)
BNP SERPL-MCNC: 33.6 PG/ML (ref 0–100)
BUN SERPL-MCNC: 26 MG/DL (ref 8–27)
BUN SERPL-MCNC: 30 MG/DL (ref 8–27)
BUN SERPL-MCNC: 30 MG/DL (ref 8–27)
BUN SERPL-MCNC: 32 MG/DL (ref 8–27)
BUN/CREAT SERPL: 27 (ref 12–28)
BUN/CREAT SERPL: 28 (ref 12–28)
BUN/CREAT SERPL: 34 (ref 12–28)
BUN/CREAT SERPL: 34 (ref 12–28)
BZE UR QL: NEGATIVE NG/ML
CALCIUM SERPL-MCNC: 9.5 MG/DL (ref 8.7–10.3)
CALCIUM SERPL-MCNC: 9.6 MG/DL (ref 8.7–10.3)
CALCIUM SERPL-MCNC: 9.6 MG/DL (ref 8.7–10.3)
CALCIUM SERPL-MCNC: 9.8 MG/DL (ref 8.7–10.3)
CANNABINOIDS UR QL SCN: NEGATIVE NG/ML
CHLORIDE SERPL-SCNC: 101 MMOL/L (ref 96–106)
CHLORIDE SERPL-SCNC: 102 MMOL/L (ref 96–106)
CHLORIDE SERPL-SCNC: 105 MMOL/L (ref 96–106)
CHLORIDE SERPL-SCNC: 105 MMOL/L (ref 96–106)
CO2 SERPL-SCNC: 26 MMOL/L (ref 20–29)
CO2 SERPL-SCNC: 27 MMOL/L (ref 20–29)
CO2 SERPL-SCNC: 30 MMOL/L (ref 20–29)
CO2 SERPL-SCNC: 32 MMOL/L (ref 20–29)
CREAT SERPL-MCNC: 0.87 MG/DL (ref 0.57–1)
CREAT SERPL-MCNC: 0.94 MG/DL (ref 0.57–1)
CREAT SERPL-MCNC: 0.96 MG/DL (ref 0.57–1)
CREAT SERPL-MCNC: 1.07 MG/DL (ref 0.57–1)
CREAT UR-MCNC: 101.5 MG/DL (ref 20–300)
EOSINOPHIL # BLD AUTO: 0.3 X10E3/UL (ref 0–0.4)
EOSINOPHIL NFR BLD AUTO: 3 %
EOSINOPHIL NFR BLD AUTO: 5 %
ERYTHROCYTE [DISTWIDTH] IN BLOOD BY AUTOMATED COUNT: 12.2 % (ref 11.7–15.4)
ERYTHROCYTE [DISTWIDTH] IN BLOOD BY AUTOMATED COUNT: 12.3 % (ref 11.7–15.4)
ERYTHROCYTE [DISTWIDTH] IN BLOOD BY AUTOMATED COUNT: 12.4 % (ref 11.7–15.4)
ERYTHROCYTE [DISTWIDTH] IN BLOOD BY AUTOMATED COUNT: 13.6 % (ref 11.7–15.4)
ETHANOL UR-MCNC: NEGATIVE %
GLOBULIN SER CALC-MCNC: 1.9 G/DL (ref 1.5–4.5)
GLOBULIN SER CALC-MCNC: 2.1 G/DL (ref 1.5–4.5)
GLOBULIN SER CALC-MCNC: 2.2 G/DL (ref 1.5–4.5)
GLOBULIN SER CALC-MCNC: 2.3 G/DL (ref 1.5–4.5)
GLUCOSE SERPL-MCNC: 100 MG/DL (ref 65–99)
GLUCOSE SERPL-MCNC: 113 MG/DL (ref 65–99)
GLUCOSE SERPL-MCNC: 78 MG/DL (ref 65–99)
GLUCOSE SERPL-MCNC: 89 MG/DL (ref 65–99)
GLUCOSE UR STRIP-MCNC: NEGATIVE MG/DL
GLUCOSE UR STRIP-MCNC: NEGATIVE MG/DL
HCT VFR BLD AUTO: 44.7 % (ref 34–46.6)
HCT VFR BLD AUTO: 45.2 % (ref 34–46.6)
HCT VFR BLD AUTO: 46.8 % (ref 34–46.6)
HCT VFR BLD AUTO: 48.3 % (ref 34–46.6)
HGB BLD-MCNC: 15.2 G/DL (ref 11.1–15.9)
HGB BLD-MCNC: 15.2 G/DL (ref 11.1–15.9)
HGB BLD-MCNC: 15.4 G/DL (ref 11.1–15.9)
HGB BLD-MCNC: 15.6 G/DL (ref 11.1–15.9)
IMM GRANULOCYTES # BLD AUTO: 0 X10E3/UL (ref 0–0.1)
IMM GRANULOCYTES NFR BLD AUTO: 0 %
KETONES UR QL: NEGATIVE
KETONES UR QL: NEGATIVE
LEUKOCYTE EST, POC: NEGATIVE
LEUKOCYTE EST, POC: NEGATIVE
LYMPHOCYTES # BLD AUTO: 2.2 X10E3/UL (ref 0.7–3.1)
LYMPHOCYTES # BLD AUTO: 2.3 X10E3/UL (ref 0.7–3.1)
LYMPHOCYTES # BLD AUTO: 2.4 X10E3/UL (ref 0.7–3.1)
LYMPHOCYTES # BLD AUTO: 2.4 X10E3/UL (ref 0.7–3.1)
LYMPHOCYTES NFR BLD AUTO: 26 %
LYMPHOCYTES NFR BLD AUTO: 28 %
LYMPHOCYTES NFR BLD AUTO: 28 %
LYMPHOCYTES NFR BLD AUTO: 33 %
MCH RBC QN AUTO: 31.4 PG (ref 26.6–33)
MCH RBC QN AUTO: 31.7 PG (ref 26.6–33)
MCH RBC QN AUTO: 32 PG (ref 26.6–33)
MCH RBC QN AUTO: 32.6 PG (ref 26.6–33)
MCHC RBC AUTO-ENTMCNC: 32.3 G/DL (ref 31.5–35.7)
MCHC RBC AUTO-ENTMCNC: 32.9 G/DL (ref 31.5–35.7)
MCHC RBC AUTO-ENTMCNC: 33.6 G/DL (ref 31.5–35.7)
MCHC RBC AUTO-ENTMCNC: 34 G/DL (ref 31.5–35.7)
MCV RBC AUTO: 94 FL (ref 79–97)
MCV RBC AUTO: 96 FL (ref 79–97)
MCV RBC AUTO: 97 FL (ref 79–97)
MCV RBC AUTO: 97 FL (ref 79–97)
MEPERIDINE UR QL: NEGATIVE NG/ML
METHADONE UR QL: NEGATIVE NG/ML
MONOCYTES # BLD AUTO: 0.5 X10E3/UL (ref 0.1–0.9)
MONOCYTES # BLD AUTO: 0.6 X10E3/UL (ref 0.1–0.9)
MONOCYTES NFR BLD AUTO: 7 %
NEUTROPHILS # BLD AUTO: 3.6 X10E3/UL (ref 1.4–7)
NEUTROPHILS # BLD AUTO: 5.2 X10E3/UL (ref 1.4–7)
NEUTROPHILS # BLD AUTO: 5.5 X10E3/UL (ref 1.4–7)
NEUTROPHILS # BLD AUTO: 5.5 X10E3/UL (ref 1.4–7)
NEUTROPHILS NFR BLD AUTO: 54 %
NEUTROPHILS NFR BLD AUTO: 61 %
NEUTROPHILS NFR BLD AUTO: 62 %
NEUTROPHILS NFR BLD AUTO: 63 %
NITRITE UR-MCNC: NEGATIVE MG/ML
NITRITE UR-MCNC: NEGATIVE MG/ML
OPIATES UR QL SCN: POSITIVE NG/ML
OXYCODONE+OXYMORPHONE UR QL SCN: NEGATIVE NG/ML
PCP UR QL: NEGATIVE NG/ML
PH UR: 6 [PH] (ref 5–8)
PH UR: 6 [PH] (ref 5–8)
PLATELET # BLD AUTO: 208 X10E3/UL (ref 150–450)
PLATELET # BLD AUTO: 216 X10E3/UL (ref 150–450)
PLATELET # BLD AUTO: 225 X10E3/UL (ref 150–450)
PLATELET # BLD AUTO: 237 X10E3/UL (ref 150–450)
POTASSIUM SERPL-SCNC: 4.4 MMOL/L (ref 3.5–5.2)
POTASSIUM SERPL-SCNC: 4.6 MMOL/L (ref 3.5–5.2)
POTASSIUM SERPL-SCNC: 4.7 MMOL/L (ref 3.5–5.2)
POTASSIUM SERPL-SCNC: 4.9 MMOL/L (ref 3.5–5.2)
PROPOXYPH UR QL: NEGATIVE NG/ML
PROT SERPL-MCNC: 6.3 G/DL (ref 6–8.5)
PROT SERPL-MCNC: 6.4 G/DL (ref 6–8.5)
PROT UR STRIP-MCNC: ABNORMAL MG/DL
PROT UR STRIP-MCNC: ABNORMAL MG/DL
RBC # BLD AUTO: 4.66 X10E6/UL (ref 3.77–5.28)
RBC # BLD AUTO: 4.8 X10E6/UL (ref 3.77–5.28)
RBC # BLD AUTO: 4.82 X10E6/UL (ref 3.77–5.28)
RBC # BLD AUTO: 4.97 X10E6/UL (ref 3.77–5.28)
RBC # UR STRIP: NEGATIVE /UL
RBC # UR STRIP: NEGATIVE /UL
SODIUM SERPL-SCNC: 142 MMOL/L (ref 134–144)
SODIUM SERPL-SCNC: 145 MMOL/L (ref 134–144)
SODIUM SERPL-SCNC: 146 MMOL/L (ref 134–144)
SODIUM SERPL-SCNC: 146 MMOL/L (ref 134–144)
SP GR UR: 1.02 (ref 1–1.03)
SP GR UR: 1.02 (ref 1–1.03)
T3FREE SERPL-MCNC: 2.6 PG/ML (ref 2–4.4)
T4 FREE SERPL-MCNC: 1.08 NG/DL (ref 0.82–1.77)
TRAMADOL UR QL SCN: NEGATIVE NG/ML
TSH SERPL DL<=0.005 MIU/L-ACNC: 3.15 UIU/ML (ref 0.45–4.5)
TSH SERPL DL<=0.005 MIU/L-ACNC: 4.16 UIU/ML (ref 0.45–4.5)
TSH SERPL DL<=0.005 MIU/L-ACNC: 4.58 UIU/ML (ref 0.45–4.5)
UROBILINOGEN UR QL: NORMAL
UROBILINOGEN UR QL: NORMAL
VIT B12 SERPL-MCNC: 634 PG/ML (ref 232–1245)
VIT B12 SERPL-MCNC: 641 PG/ML (ref 232–1245)
WBC # BLD AUTO: 6.6 X10E3/UL (ref 3.4–10.8)
WBC # BLD AUTO: 8.5 X10E3/UL (ref 3.4–10.8)
WBC # BLD AUTO: 8.8 X10E3/UL (ref 3.4–10.8)
WBC # BLD AUTO: 8.8 X10E3/UL (ref 3.4–10.8)
WRITTEN AUTHORIZATION: NORMAL

## 2020-01-01 PROCEDURE — 99214 OFFICE O/P EST MOD 30 MIN: CPT | Performed by: FAMILY MEDICINE

## 2020-01-01 PROCEDURE — 81003 URINALYSIS AUTO W/O SCOPE: CPT | Performed by: FAMILY MEDICINE

## 2020-01-01 PROCEDURE — G0008 ADMIN INFLUENZA VIRUS VAC: HCPCS | Performed by: FAMILY MEDICINE

## 2020-01-01 PROCEDURE — 90694 VACC AIIV4 NO PRSRV 0.5ML IM: CPT | Performed by: FAMILY MEDICINE

## 2020-01-01 PROCEDURE — 99213 OFFICE O/P EST LOW 20 MIN: CPT | Performed by: FAMILY MEDICINE

## 2020-01-01 RX ORDER — HYDROCODONE BITARTRATE AND ACETAMINOPHEN 7.5; 325 MG/1; MG/1
1 TABLET ORAL EVERY 6 HOURS PRN
Qty: 120 TABLET | Refills: 0 | Status: SHIPPED | OUTPATIENT
Start: 2020-01-01 | End: 2020-01-01 | Stop reason: SDUPTHER

## 2020-01-01 RX ORDER — HYDROCODONE BITARTRATE AND ACETAMINOPHEN 5; 325 MG/1; MG/1
1 TABLET ORAL EVERY 6 HOURS PRN
Qty: 90 TABLET | Refills: 0 | Status: SHIPPED | OUTPATIENT
Start: 2020-01-01 | End: 2020-01-01 | Stop reason: SDUPTHER

## 2020-01-01 RX ORDER — HYDROCODONE BITARTRATE AND ACETAMINOPHEN 7.5; 325 MG/1; MG/1
1 TABLET ORAL EVERY 6 HOURS PRN
Qty: 120 TABLET | Refills: 0 | Status: SHIPPED | OUTPATIENT
Start: 2020-01-01 | End: 2020-01-01

## 2020-01-01 RX ORDER — HYDROCODONE BITARTRATE AND ACETAMINOPHEN 7.5; 325 MG/1; MG/1
TABLET ORAL
Qty: 120 TABLET | Refills: 0 | Status: SHIPPED | OUTPATIENT
Start: 2020-01-01 | End: 2020-01-01

## 2020-01-01 RX ORDER — MELOXICAM 7.5 MG/1
7.5 TABLET ORAL DAILY
Qty: 30 TABLET | Refills: 1 | Status: SHIPPED | OUTPATIENT
Start: 2020-01-01 | End: 2020-01-01

## 2020-01-01 RX ORDER — HYDROCODONE BITARTRATE AND ACETAMINOPHEN 5; 325 MG/1; MG/1
1 TABLET ORAL EVERY 6 HOURS PRN
Qty: 120 TABLET | Refills: 0 | Status: SHIPPED | OUTPATIENT
Start: 2020-01-01 | End: 2020-01-01 | Stop reason: SDUPTHER

## 2020-01-01 RX ORDER — HYDROCODONE BITARTRATE AND ACETAMINOPHEN 5; 325 MG/1; MG/1
1 TABLET ORAL EVERY 6 HOURS PRN
Qty: 120 TABLET | Refills: 0 | Status: SHIPPED | OUTPATIENT
Start: 2020-01-01

## 2020-01-01 RX ORDER — PANTOPRAZOLE SODIUM 40 MG/1
TABLET, DELAYED RELEASE ORAL
Qty: 30 TABLET | Refills: 2 | Status: SHIPPED | OUTPATIENT
Start: 2020-01-01 | End: 2020-01-01

## 2020-01-01 RX ORDER — HYDROCODONE BITARTRATE AND ACETAMINOPHEN 7.5; 325 MG/1; MG/1
0.5 TABLET ORAL EVERY 6 HOURS PRN
Refills: 0
Start: 2020-01-01 | End: 2020-01-01 | Stop reason: DRUGHIGH

## 2020-01-01 RX ORDER — HYDROCODONE BITARTRATE AND ACETAMINOPHEN 5; 325 MG/1; MG/1
1 TABLET ORAL EVERY 6 HOURS PRN
Qty: 120 TABLET | Refills: 0 | OUTPATIENT
Start: 2020-01-01

## 2020-01-01 RX ORDER — METOPROLOL SUCCINATE 50 MG/1
TABLET, EXTENDED RELEASE ORAL
Qty: 90 TABLET | Refills: 0 | Status: SHIPPED | OUTPATIENT
Start: 2020-01-01

## 2020-01-01 RX ORDER — PANTOPRAZOLE SODIUM 40 MG/1
40 TABLET, DELAYED RELEASE ORAL DAILY
Qty: 30 TABLET | Refills: 2 | Status: SHIPPED | OUTPATIENT
Start: 2020-01-01 | End: 2020-01-01

## 2020-01-01 RX ORDER — DOCUSATE SODIUM 100 MG/1
100 CAPSULE, LIQUID FILLED ORAL 2 TIMES DAILY
Start: 2020-01-01

## 2020-01-01 RX ORDER — PANTOPRAZOLE SODIUM 40 MG/1
40 TABLET, DELAYED RELEASE ORAL DAILY
Qty: 30 TABLET | Refills: 2 | Status: SHIPPED | OUTPATIENT
Start: 2020-01-01

## 2020-01-01 RX ORDER — HYDROCODONE BITARTRATE AND ACETAMINOPHEN 7.5; 325 MG/1; MG/1
TABLET ORAL
Qty: 120 TABLET | OUTPATIENT
Start: 2020-01-01

## 2020-01-01 RX ORDER — PANTOPRAZOLE SODIUM 40 MG/1
TABLET, DELAYED RELEASE ORAL
Qty: 30 TABLET | Refills: 2 | Status: SHIPPED | OUTPATIENT
Start: 2020-01-01 | End: 2020-01-01 | Stop reason: SDUPTHER

## 2020-01-10 ENCOUNTER — TELEPHONE (OUTPATIENT)
Dept: FAMILY MEDICINE CLINIC | Facility: CLINIC | Age: 85
End: 2020-01-10

## 2020-01-10 NOTE — TELEPHONE ENCOUNTER
Pt daughter called stated that the patient has a dual insurance plan where they will pay for 31 visits of someone coming out to the house for up to 4 hours a day for light house work, bathing, and help with meals. One agency that does provide it is called [facility]. The daughter wanted to know what you though of it and if you have had any of your other patients use such a service? It isn't like home health because it doesn't have PT, OT, ST, or NT coming out. If you think it is a good idea they will need a referral to whichever agency the family picks.

## 2020-01-13 NOTE — TELEPHONE ENCOUNTER
Patient daughter Iris made aware. [employer] will reach out to us for the approval. They will be using [employer].

## 2020-01-22 NOTE — TELEPHONE ENCOUNTER
Pt is requesting Hydrocodone 7.5.  Elton, UDS and contract were all done on 12/16/19 Medication pend, please refill

## 2020-03-16 PROBLEM — Z79.899 HIGH RISK MEDICATION USE: Status: ACTIVE | Noted: 2020-01-01

## 2020-03-16 PROBLEM — J44.9 COPD (CHRONIC OBSTRUCTIVE PULMONARY DISEASE) (HCC): Status: ACTIVE | Noted: 2020-01-01

## 2020-03-16 PROBLEM — K21.9 ESOPHAGEAL REFLUX: Status: ACTIVE | Noted: 2020-01-01

## 2020-03-16 NOTE — PROGRESS NOTES
Subjective   Ivis Yin is a 84 y.o. female.     Chief Complaint   Patient presents with   • Hypertension   • Tremors        Patient presents for her routine 3-month chronic pain follow-up.  She still requires her pain medicine to function from her arthritis pain.  She lives independently with her daughter checking on her quite frequently including daily.  She is stable with her COPD and hypertension.  She has been concerned about her constipation.  She has to work really hard to move her bowels.  We had a lengthy discussion about options.  She has been concerned about her tremor but the beta-blockers made her too tired in the past.  Daughter is been worried about her B12 again.  They want to consider may be a different medication for her tremor pending her blood work today.       CONTRACT:9/11/2019  Elton: 3/16/2019  UDS: 12/16/2019    The following portions of the patient's history were reviewed and updated as appropriate: allergies, current medications, past family history, past medical history, past social history, past surgical history and problem list.    Past Medical History:   Diagnosis Date   • Abdominal pain    • Abnormal blood chemistry    • Abnormal creatine kinase level    • Abnormal thyroid screen (blood)    • Adjustment disorder with mixed anxiety and depressed mood    • Anxiety disorder    • Arthritis    • Benign familial tremor    • BMI 27.0-27.9,adult    • Cervical stenosis of spine    • Chronic pain    • Constipation    • COPD (chronic obstructive pulmonary disease) (CMS/HCC)    • Depression    • Dermatitis    • Dermatophytosis    • Dizziness    • Dyspnea    • Edema    • Elevated hematocrit    • Encounter for long-term (current) use of medications    • Esophageal reflux    • Extremity atherosclerosis with intermittent claudication (CMS/HCC)    • Fatigue    • Generalized osteoarthritis    • Hyperlipidemia    • Hypertension    • Hypokalemia    • Hypothyroidism    • Impacted cerumen    •  Insomnia    • Limb pain    • Limb swelling    • Loss of weight    • Mild renal insufficiency    • Mumps    • Muscle cramp    • Muscle weakness    • Nausea    • Osteoporosis    • Peripheral vertigo    • Seborrheic keratosis    • Statin myopathy    • Tinnitus    • Tobacco use    • Tremor    • Varicella    • Vitamin B12 deficiency    • Vitamin D deficiency    • Weakness        Past Surgical History:   Procedure Laterality Date   • APPENDECTOMY  1959   • BLADDER SURGERY  1985    bladder repair   • CATARACT EXTRACTION     • COLONOSCOPY  2007   • HERNIA REPAIR      1995, 2000, 2005, 2010, 2/2012   • HYSTERECTOMY     • INGUINAL HERNIA REPAIR     • SHOULDER SURGERY  08/28/2013       Family History   Problem Relation Age of Onset   • Emphysema Mother    • COPD Mother    • Osteoporosis Mother    • Cancer Father        Social History     Socioeconomic History   • Marital status:      Spouse name: Not on file   • Number of children: Not on file   • Years of education: Not on file   • Highest education level: Not on file   Tobacco Use   • Smoking status: Current Every Day Smoker   • Smokeless tobacco: Current User   • Tobacco comment: smokes 1-2 PPD for 30 years (10/4/18)   Substance and Sexual Activity   • Alcohol use: No     Frequency: Never   • Drug use: No       Review of Systems   Constitutional: Positive for fatigue. Negative for chills and fever.   HENT: Negative for congestion, rhinorrhea and sore throat.    Respiratory: Positive for cough. Negative for shortness of breath.    Cardiovascular: Negative for chest pain and leg swelling.   Gastrointestinal: Positive for constipation. Negative for abdominal pain.   Endocrine: Negative for polydipsia and polyuria.   Genitourinary: Negative for dysuria.   Musculoskeletal: Positive for arthralgias and back pain. Negative for myalgias.   Skin: Negative for rash.   Neurological: Positive for tremors. Negative for dizziness.   Hematological: Does not bruise/bleed easily.  "  Psychiatric/Behavioral: Negative for sleep disturbance.       Objective   Vitals:    03/16/20 1333   BP: 124/80   Pulse: 90   Temp: 97.9 °F (36.6 °C)   SpO2: 94%   Weight: 64 kg (141 lb)   Height: 160 cm (63\")     Body mass index is 24.98 kg/m².  Physical Exam   Constitutional: She is oriented to person, place, and time. She appears well-developed and well-nourished.   HENT:   Head: Normocephalic and atraumatic.   Eyes: Pupils are equal, round, and reactive to light. Conjunctivae and EOM are normal.   Neck: Neck supple. No thyromegaly present.   Cardiovascular: Normal rate and regular rhythm.   No murmur heard.  Pulmonary/Chest: Effort normal. She has decreased breath sounds. She has no wheezes.   Abdominal: Soft.   Musculoskeletal: Normal range of motion.   Tender throughout bilateral knees with arthritic changes and tender lower back.   Neurological: She is alert and oriented to person, place, and time. No cranial nerve deficit.   Tremor at rest and with intention  Gait slow   Skin: Skin is warm and dry.   Psychiatric: She has a normal mood and affect.         Assessment/Plan   Ivis was seen today for hypertension and tremors.    Diagnoses and all orders for this visit:    Essential hypertension  -     CBC & Differential  -     Comprehensive Metabolic Panel    Other chronic pain  -     HYDROcodone-acetaminophen (NORCO) 7.5-325 MG per tablet; Take 1 tablet by mouth Every 6 (Six) Hours As Needed for Moderate Pain  or Severe Pain . for pain    High risk medication use    Vitamin B12 deficiency  -     Vitamin B12    Drug-induced constipation  -     docusate sodium (COLACE) 100 MG capsule; Take 1 capsule by mouth 2 (Two) Times a Day.  -     Psyllium 500 MG capsule; Take 3 capsules by mouth 2 (Two) Times a Day.    Gastroesophageal reflux disease without esophagitis    Chronic obstructive pulmonary disease, unspecified COPD type (CMS/HCC)               Patient Instructions   If after taking the Colace and psyllium " fiber every day for a couple weeks, you are still not having regular bowel movements take MiraLAX daily as needed.  It is okay to take the MiraLAX every day.

## 2020-03-16 NOTE — PATIENT INSTRUCTIONS
If after taking the Colace and psyllium fiber every day for a couple weeks, you are still not having regular bowel movements take MiraLAX daily as needed.  It is okay to take the MiraLAX every day.

## 2020-06-24 NOTE — TELEPHONE ENCOUNTER
Pt requesting refill on norco 7.5  noble 03/16/2020  Contract 09/11/2019  Dany dickey ran  Last fill 05/28/2020 #120  UDS 12/16/2019  LOV 03/16/2020  Med pend #120

## 2020-06-29 NOTE — TELEPHONE ENCOUNTER
DAY SENT OVER A ORDER FOR HOME HEALTH FOR THE MEMBER BUT [facility] CANNOT TAKE THE PT AS THEY ARE NOT TAKING ANTHEM PATIENTS CURRENTLY.

## 2020-06-29 NOTE — PROGRESS NOTES
Subjective   Ivis Yin is a 84 y.o. female.     Chief Complaint   Patient presents with   • Hypertension   • Hyperlipidemia        Patient presents for follow-up of her hypertension COPD and chronic arthritis pain.  She states she still requires her hydrocodone to function.  Her daughter is with her today.  She still manages at home by herself.  Her daughter checks on her every other day.  Patient gets short of breath but refuses inhalers and still continues to smoke.  She is worried about her tremor but has tried many medications without relief or not liking side effects.         The following portions of the patient's history were reviewed and updated as appropriate: allergies, current medications, past family history, past medical history, past social history, past surgical history and problem list.    Past Medical History:   Diagnosis Date   • Abdominal pain    • Abnormal blood chemistry    • Abnormal creatine kinase level    • Abnormal thyroid screen (blood)    • Adjustment disorder with mixed anxiety and depressed mood    • Anxiety disorder    • Arthritis    • Benign familial tremor    • BMI 27.0-27.9,adult    • Cervical stenosis of spine    • Chronic pain    • Constipation    • COPD (chronic obstructive pulmonary disease) (CMS/HCC)    • Depression    • Dermatitis    • Dermatophytosis    • Dizziness    • Dyspnea    • Edema    • Elevated hematocrit    • Encounter for long-term (current) use of medications    • Esophageal reflux    • Extremity atherosclerosis with intermittent claudication (CMS/HCC)    • Fatigue    • Generalized osteoarthritis    • Hyperlipidemia    • Hypertension    • Hypokalemia    • Hypothyroidism    • Impacted cerumen    • Insomnia    • Limb pain    • Limb swelling    • Loss of weight    • Mild renal insufficiency    • Mumps    • Muscle cramp    • Muscle weakness    • Nausea    • Osteoporosis    • Peripheral vertigo    • Seborrheic keratosis    • Statin myopathy    • Tinnitus    •  Tobacco use    • Tremor    • Varicella    • Vitamin B12 deficiency    • Vitamin D deficiency    • Weakness        Past Surgical History:   Procedure Laterality Date   • APPENDECTOMY  1959   • BLADDER SURGERY  1985    bladder repair   • CATARACT EXTRACTION     • COLONOSCOPY  2007   • HERNIA REPAIR      1995, 2000, 2005, 2010, 2/2012   • HYSTERECTOMY     • INGUINAL HERNIA REPAIR     • SHOULDER SURGERY  08/28/2013       Family History   Problem Relation Age of Onset   • Emphysema Mother    • COPD Mother    • Osteoporosis Mother    • Cancer Father        Social History     Socioeconomic History   • Marital status:      Spouse name: Not on file   • Number of children: Not on file   • Years of education: Not on file   • Highest education level: Not on file   Tobacco Use   • Smoking status: Current Every Day Smoker   • Smokeless tobacco: Current User   • Tobacco comment: smokes 1-2 PPD for 30 years (10/4/18)   Substance and Sexual Activity   • Alcohol use: No     Frequency: Never   • Drug use: No         Current Outpatient Medications:   •  erythromycin (ROMYCIN) 5 MG/GM ophthalmic ointment, ONE APPLICATION IN THE RIGHT EYE IN THE EVENING, Disp: , Rfl: 3  •  HYDROcodone-acetaminophen (NORCO) 7.5-325 MG per tablet, TAKE ONE TABLET BY MOUTH EVERY 6 HOURS AS NEEDED FOR PAIN, Disp: 120 tablet, Rfl: 0  •  metoprolol succinate XL (TOPROL XL) 50 MG 24 hr tablet, Take 1 tablet by mouth Daily., Disp: 90 tablet, Rfl: 3  •  pantoprazole (PROTONIX) 40 MG EC tablet, TAKE ONE TABLET BY MOUTH DAILY, Disp: 30 tablet, Rfl: 2  •  Psyllium 500 MG capsule, Take 3 capsules by mouth 2 (Two) Times a Day., Disp: , Rfl:   •  docusate sodium (COLACE) 100 MG capsule, Take 1 capsule by mouth 2 (Two) Times a Day., Disp: , Rfl:     Review of Systems   Constitutional: Negative for chills, fatigue and fever.   HENT: Negative for congestion, rhinorrhea and sore throat.    Respiratory: Negative for cough and shortness of breath.    Cardiovascular:  "Negative for chest pain and leg swelling.   Gastrointestinal: Negative for abdominal pain.   Endocrine: Negative for polydipsia and polyuria.   Genitourinary: Negative for dysuria.   Musculoskeletal: Positive for arthralgias and back pain. Negative for myalgias.   Skin: Negative for rash.   Neurological: Positive for tremors. Negative for dizziness.   Hematological: Does not bruise/bleed easily.   Psychiatric/Behavioral: Positive for confusion. Negative for dysphoric mood, hallucinations, self-injury, sleep disturbance and suicidal ideas. The patient is not nervous/anxious.        Objective   Vitals:    06/29/20 1301   BP: 126/74   Pulse: 62   Temp: 96.6 °F (35.9 °C)   SpO2: 97%   Weight: 65.6 kg (144 lb 9.6 oz)   Height: 160 cm (63\")     Body mass index is 25.61 kg/m².  Physical Exam   Constitutional: She is oriented to person, place, and time. She appears well-developed and well-nourished.   HENT:   Head: Normocephalic and atraumatic.   Eyes: Pupils are equal, round, and reactive to light. Conjunctivae and EOM are normal.   Neck: Neck supple. No thyromegaly present.   Cardiovascular: Normal rate and regular rhythm.   No murmur heard.  Pulmonary/Chest: Effort normal and breath sounds normal. She has no wheezes.   Abdominal: Soft.   Musculoskeletal: Normal range of motion.   Neurological: She is alert and oriented to person, place, and time. No cranial nerve deficit.   Positive tremor at rest   Skin: Skin is warm and dry.   Psychiatric: She has a normal mood and affect.         Assessment/Plan   Ivis was seen today for hypertension and hyperlipidemia.    Diagnoses and all orders for this visit:    Essential hypertension  -     CBC & Differential  -     Comprehensive Metabolic Panel  -     TSH    Chronic obstructive pulmonary disease, unspecified COPD type (CMS/HCC)    Other chronic pain    Gastroesophageal reflux disease without esophagitis    High risk medication use  -     Drug Profile [id number] - Urine, Clean " Catch    Weakness  -     Ambulatory Referral to Home Health    Abnormality of gait  -     Ambulatory Referral to Home Health               There are no Patient Instructions on file for this visit.

## 2020-07-02 NOTE — TELEPHONE ENCOUNTER
Verbal order given to Shaina PT with [facility] to order OT for patient. Shaina also stated the patient would benefit from a rollator walker. I will put the DWO on your desk to sign.

## 2020-07-29 NOTE — TELEPHONE ENCOUNTER
fyi patient is being discharged from [facility] due to her insurance no longer covering pt. The patient does have her rollator now and was trained on the use of it.

## 2020-08-20 NOTE — TELEPHONE ENCOUNTER
Pt requesting refill on [facility]  LOV 06/29/2020  UDS 06/29/2020  Last fill 07/24/2020  YA 06/29/2020  Contract 09/11/2019  Med pend

## 2020-09-08 NOTE — PATIENT INSTRUCTIONS
Decrease pain pills to a half a tablet 4 times a day with 325 mg Tylenol for pain.  Await further test results.

## 2020-09-08 NOTE — PROGRESS NOTES
Subjective   Ivis Yin is a 84 y.o. female.     Chief Complaint   Patient presents with   • Fall     On July 12th   • Speech Problem     August 8th, talking Geberish, words not connecting, been talking this way off and on for the last week   • Altered Mental Status     Before speech turns to geberish        Fell on July 12th.  Went backward and hit her head on the door jamb.   Went to the ER finally on the 18th.  Was diagnosed with scalp hematoma.  Started talking gibberish then about 8/8.  Daughters rushed over and called EMS.  They thougth she had a drug overdose.  But she worse that she had not taken more of her pain pills.   Did not bring the bottle with her.  Fell again this past weekend.  Still living at home.  Daughters can look into alternative living situations.  Patient states she still requires pain medicine for control of her chronic back pain.  She seems agreeable to the plan of backing off the medicine while in the office.  She denies taking more than is prescribed.           The following portions of the patient's history were reviewed and updated as appropriate: allergies, current medications, past family history, past medical history, past social history, past surgical history and problem list.    Past Medical History:   Diagnosis Date   • Abdominal pain    • Abnormal blood chemistry    • Abnormal creatine kinase level    • Abnormal thyroid screen (blood)    • Adjustment disorder with mixed anxiety and depressed mood    • Anxiety disorder    • Arthritis    • Benign familial tremor    • BMI 27.0-27.9,adult    • Cervical stenosis of spine    • Chronic pain    • Constipation    • COPD (chronic obstructive pulmonary disease) (CMS/[facility])    • Depression    • Dermatitis    • Dermatophytosis    • Dizziness    • Dyspnea    • Edema    • Elevated hematocrit    • Encounter for long-term (current) use of medications    • Esophageal reflux    • Extremity atherosclerosis with intermittent claudication  (CMS/[facility])    • Fatigue    • Generalized osteoarthritis    • Hyperlipidemia    • Hypertension    • Hypokalemia    • Hypothyroidism    • Impacted cerumen    • Insomnia    • Limb pain    • Limb swelling    • Loss of weight    • Mild renal insufficiency    • Mumps    • Muscle cramp    • Muscle weakness    • Nausea    • Osteoporosis    • Peripheral vertigo    • Seborrheic keratosis    • Statin myopathy    • Tinnitus    • Tobacco use    • Tremor    • Varicella    • Vitamin B12 deficiency    • Vitamin D deficiency    • Weakness        Past Surgical History:   Procedure Laterality Date   • APPENDECTOMY  1959   • BLADDER SURGERY  1985    bladder repair   • CATARACT EXTRACTION     • COLONOSCOPY  2007   • HERNIA REPAIR      1995, 2000, 2005, 2010, 2/2012   • HYSTERECTOMY     • INGUINAL HERNIA REPAIR     • SHOULDER SURGERY  08/28/2013       Family History   Problem Relation Age of Onset   • Emphysema Mother    • COPD Mother    • Osteoporosis Mother    • Cancer Father        Social History     Socioeconomic History   • Marital status:      Spouse name: Not on file   • Number of children: Not on file   • Years of education: Not on file   • Highest education level: Not on file   Tobacco Use   • Smoking status: Current Every Day Smoker   • Smokeless tobacco: Current User   • Tobacco comment: smokes 1-2 PPD for 30 years (10/4/18)   Substance and Sexual Activity   • Alcohol use: No     Frequency: Never   • Drug use: No         Current Outpatient Medications:   •  docusate sodium (COLACE) 100 MG capsule, Take 1 capsule by mouth 2 (Two) Times a Day., Disp: , Rfl:   •  erythromycin (ROMYCIN) 5 MG/GM ophthalmic ointment, ONE APPLICATION IN THE RIGHT EYE IN THE EVENING, Disp: , Rfl: 3  •  HYDROcodone-acetaminophen (NORCO) 7.5-325 MG per tablet, Take 0.5 tablets by mouth Every 6 (Six) Hours As Needed for Severe Pain . for pain, Disp: , Rfl: 0  •  metoprolol succinate XL (TOPROL XL) 50 MG 24 hr tablet, Take 1 tablet by mouth Daily.,  "Disp: 90 tablet, Rfl: 3  •  pantoprazole (PROTONIX) 40 MG EC tablet, TAKE ONE TABLET BY MOUTH DAILY, Disp: 30 tablet, Rfl: 2  •  Psyllium 500 MG capsule, Take 3 capsules by mouth 2 (Two) Times a Day., Disp: , Rfl:     Review of Systems   Constitutional: Negative for chills, fatigue and fever.   HENT: Negative for congestion, rhinorrhea and sore throat.    Eyes: Negative.    Respiratory: Negative for cough and shortness of breath.    Cardiovascular: Negative for chest pain and leg swelling.   Gastrointestinal: Negative for abdominal pain.   Endocrine: Negative for polydipsia and polyuria.   Genitourinary: Negative for dysuria.   Musculoskeletal: Negative for arthralgias and myalgias.   Skin: Negative for rash.   Neurological: Positive for speech difficulty and weakness. Negative for dizziness, tremors, seizures, syncope, light-headedness, numbness and headaches.   Hematological: Does not bruise/bleed easily.   Psychiatric/Behavioral: Positive for confusion. Negative for agitation, behavioral problems, decreased concentration, dysphoric mood, hallucinations, self-injury, sleep disturbance and suicidal ideas. The patient is not nervous/anxious and is not hyperactive.        Objective   Vitals:    09/08/20 1339   BP: 122/82   Pulse: 59   Temp: 97.8 °F (36.6 °C)   SpO2: 98%   Weight: 65 kg (143 lb 6.4 oz)   Height: 160 cm (63\")     Body mass index is 25.4 kg/m².  Physical Exam   Constitutional: She appears well-developed and well-nourished.   HENT:   Head: Normocephalic.   Right Ear: Tympanic membrane and external ear normal.   Left Ear: Tympanic membrane and external ear normal.   Nose: Nose normal.   Mouth/Throat: Oropharynx is clear and moist.   Right parietal hematoma resolving, no discoloration   Eyes: Pupils are equal, round, and reactive to light. Conjunctivae, EOM and lids are normal.   Fundoscopic exam:       The right eye shows no hemorrhage.        The left eye shows no hemorrhage.   Neck: Neck supple. No " thyromegaly present.   Cardiovascular: Normal rate and regular rhythm.   No murmur heard.  Pulmonary/Chest: Effort normal and breath sounds normal. She has no wheezes.   Abdominal: Soft.   Musculoskeletal: Normal range of motion.   Neurological: She is alert. She displays normal reflexes. No cranial nerve deficit or sensory deficit. She exhibits normal muscle tone. Coordination normal.   Confused to date but was oriented to year and place and purpose   Skin: Skin is warm and dry.   Psychiatric: She has a normal mood and affect.       Office Visit on 09/08/2020   Component Date Value Ref Range Status   • Specific Gravity  09/08/2020 1.020  1.005 - 1.030 Final   • pH, Urine 09/08/2020 6.0  5.0 - 8.0 Final   • Leukocytes 09/08/2020 Negative  Negative Final   • Nitrite, UA 09/08/2020 Negative  Negative Final   • Protein, POC 09/08/2020 Trace* Negative mg/dL Final   • Glucose, UA 09/08/2020 Negative  Negative, 1000 mg/dL (3+) mg/dL Final   • Ketones, UA 09/08/2020 Negative  Negative Final   • Urobilinogen, UA 09/08/2020 Normal  Normal Final   • Bilirubin 09/08/2020 Negative  Negative Final   • Blood, UA 09/08/2020 Negative  Negative Final          Assessment/Plan   Ivis was seen today for fall, speech problem and altered mental status.    Diagnoses and all orders for this visit:    Speech disturbance, unspecified type  -     POC Urinalysis Dipstick, Automated  -     TSH  -     CBC & Differential  -     Comprehensive Metabolic Panel  -     Urine Culture - Urine, Urine, Clean Catch  -     Vitamin B12    Multiple falls  -     CT Head Without Contrast; Future    Contusion of head, unspecified part of head, initial encounter    High risk medication use    Other chronic pain  -     HYDROcodone-acetaminophen (NORCO) 7.5-325 MG per tablet; Take 0.5 tablets by mouth Every 6 (Six) Hours As Needed for Severe Pain . for pain    At risk for change in mental status  -     POC Urinalysis Dipstick, Automated  -     TSH  -     CBC &  Differential  -     Comprehensive Metabolic Panel  -     Urine Culture - Urine, Urine, Clean Catch  -     Vitamin B12    Weakness   -     Urine Culture - Urine, Urine, Clean Catch               Patient Instructions   Decrease pain pills to a half a tablet 4 times a day with 325 mg Tylenol for pain.  Await further test results.

## 2020-09-29 NOTE — PROGRESS NOTES
Subjective   Ivis Yin is a 84 y.o. female.     Chief Complaint   Patient presents with   • Pain     FOLLOW UP ON CHRONIC PAIN   • Hypertension        Presents for follow-up of her chronic pain.  She is still not happy about me cutting her pain medicine in half last time but has done much better per her daughter.  She has not had any more falls and has not been slurring her speech on the phone ever.  The daughter has complete control of her pain medicine right now.  Patient wondering what else she can do for pain.  She still hurts all the time.  She has not tried any anti-inflammatories in a while.  Her last creatinine was normal.         The following portions of the patient's history were reviewed and updated as appropriate: allergies, current medications, past family history, past medical history, past social history, past surgical history and problem list.    Past Medical History:   Diagnosis Date   • Abdominal pain    • Abnormal blood chemistry    • Abnormal creatine kinase level    • Abnormal thyroid screen (blood)    • Adjustment disorder with mixed anxiety and depressed mood    • Anxiety disorder    • Arthritis    • Benign familial tremor    • BMI 27.0-27.9,adult    • Cervical stenosis of spine    • Chronic pain    • Constipation    • COPD (chronic obstructive pulmonary disease) (CMS/HCC)    • Depression    • Dermatitis    • Dermatophytosis    • Dizziness    • Dyspnea    • Edema    • Elevated hematocrit    • Encounter for long-term (current) use of medications    • Esophageal reflux    • Extremity atherosclerosis with intermittent claudication (CMS/HCC)    • Fatigue    • Generalized osteoarthritis    • Hyperlipidemia    • Hypertension    • Hypokalemia    • Hypothyroidism    • Impacted cerumen    • Insomnia    • Limb pain    • Limb swelling    • Loss of weight    • Mild renal insufficiency    • Mumps    • Muscle cramp    • Muscle weakness    • Nausea    • Osteoporosis    • Peripheral vertigo    •  Seborrheic keratosis    • Statin myopathy    • Tinnitus    • Tobacco use    • Tremor    • Varicella    • Vitamin B12 deficiency    • Vitamin D deficiency    • Weakness        Past Surgical History:   Procedure Laterality Date   • APPENDECTOMY  1959   • BLADDER SURGERY  1985    bladder repair   • CATARACT EXTRACTION     • COLONOSCOPY  2007   • HERNIA REPAIR      1995, 2000, 2005, 2010, 2/2012   • HYSTERECTOMY     • INGUINAL HERNIA REPAIR     • SHOULDER SURGERY  08/28/2013       Family History   Problem Relation Age of Onset   • Emphysema Mother    • COPD Mother    • Osteoporosis Mother    • Cancer Father        Social History     Socioeconomic History   • Marital status:      Spouse name: Not on file   • Number of children: Not on file   • Years of education: Not on file   • Highest education level: Not on file   Tobacco Use   • Smoking status: Current Every Day Smoker   • Smokeless tobacco: Current User   • Tobacco comment: smokes 1-2 PPD for 30 years (10/4/18)   Substance and Sexual Activity   • Alcohol use: No     Frequency: Never   • Drug use: No         Current Outpatient Medications:   •  docusate sodium (COLACE) 100 MG capsule, Take 1 capsule by mouth 2 (Two) Times a Day., Disp: , Rfl:   •  erythromycin (ROMYCIN) 5 MG/GM ophthalmic ointment, ONE APPLICATION IN THE RIGHT EYE IN THE EVENING, Disp: , Rfl: 3  •  metoprolol succinate XL (TOPROL XL) 50 MG 24 hr tablet, Take 1 tablet by mouth Daily., Disp: 90 tablet, Rfl: 3  •  pantoprazole (PROTONIX) 40 MG EC tablet, TAKE ONE TABLET BY MOUTH DAILY, Disp: 30 tablet, Rfl: 2  •  Psyllium 500 MG capsule, Take 3 capsules by mouth 2 (Two) Times a Day., Disp: , Rfl:   •  HYDROcodone-acetaminophen (NORCO) 5-325 MG per tablet, Take 1 tablet by mouth Every 6 (Six) Hours As Needed for Moderate Pain  or Severe Pain ., Disp: 90 tablet, Rfl: 0  •  meloxicam (Mobic) 7.5 MG tablet, Take 1 tablet by mouth Daily., Disp: 30 tablet, Rfl: 1    Review of Systems   Constitutional:  "Negative for chills, fatigue and fever.   HENT: Negative for congestion, rhinorrhea and sore throat.    Respiratory: Negative for cough and shortness of breath.    Cardiovascular: Negative for chest pain and leg swelling.   Gastrointestinal: Negative for abdominal pain.   Endocrine: Negative for polydipsia and polyuria.   Genitourinary: Negative for dysuria.   Musculoskeletal: Positive for arthralgias and back pain. Negative for myalgias.   Skin: Negative for rash.   Neurological: Negative for dizziness.   Hematological: Does not bruise/bleed easily.   Psychiatric/Behavioral: Negative for sleep disturbance.       Objective   Vitals:    09/29/20 1316 09/29/20 1344   BP: 178/100 160/92   Pulse: 78    Temp: 97.1 °F (36.2 °C)    SpO2: 94%    Weight: 65.3 kg (144 lb)    Height: 160 cm (63\")      Body mass index is 25.51 kg/m².  Physical Exam  Vitals signs and nursing note reviewed.   Constitutional:       Appearance: She is well-developed.   HENT:      Head: Normocephalic and atraumatic.      Right Ear: Tympanic membrane, ear canal and external ear normal.      Left Ear: Tympanic membrane, ear canal and external ear normal.      Nose: Nose normal.      Mouth/Throat:      Mouth: Mucous membranes are moist.      Pharynx: Oropharynx is clear.   Eyes:      Conjunctiva/sclera: Conjunctivae normal.      Pupils: Pupils are equal, round, and reactive to light.   Neck:      Musculoskeletal: Neck supple.      Thyroid: No thyromegaly.   Cardiovascular:      Rate and Rhythm: Normal rate and regular rhythm.      Heart sounds: No murmur.   Pulmonary:      Effort: Pulmonary effort is normal.      Breath sounds: Decreased breath sounds present. No wheezing.   Abdominal:      Palpations: Abdomen is soft.   Musculoskeletal: Normal range of motion.   Skin:     General: Skin is warm and dry.   Neurological:      Mental Status: She is alert and oriented to person, place, and time.      Cranial Nerves: No cranial nerve deficit.   Psychiatric: "         Mood and Affect: Mood normal.         Behavior: Behavior normal.           Assessment/Plan   Ivis was seen today for pain and hypertension.    Diagnoses and all orders for this visit:    Other chronic pain  -     meloxicam (Mobic) 7.5 MG tablet; Take 1 tablet by mouth Daily.  -     HYDROcodone-acetaminophen (NORCO) 5-325 MG per tablet; Take 1 tablet by mouth Every 6 (Six) Hours As Needed for Moderate Pain  or Severe Pain .    High risk medication use    Essential hypertension               There are no Patient Instructions on file for this visit.

## 2020-10-05 NOTE — TELEPHONE ENCOUNTER
Patient's daughter Iris called and stated the meloxicam (Mobic) 7.5 MG tablet that was given to patient to help wean off of Hydrocodone is causing her to be very dizzy. Patient's daughter is having the patient to stop taking Meloxicam and would like something else to try to help with the weaning of hydrocodone     Please advise     [phone]

## 2020-10-06 NOTE — TELEPHONE ENCOUNTER
She was taking Tylenol with it.  If she is not tolerating the meloxicam, then I would recommend adding back in the supplement Tylenol so that her hydrocodone with acetaminophen plus a plain acetaminophen equal 2 g of acetaminophen daily.

## 2020-10-22 NOTE — TELEPHONE ENCOUNTER
Pt requesting refill on NORCO  LOV 09/29/2020  UDS 06/29/2020  Last fill 09/29/2020  Agreement 09/11/2019  Med pend

## 2020-10-27 NOTE — PROGRESS NOTES
Subjective   Ivis Yin is a 84 y.o. female.     Chief Complaint   Patient presents with   • Leg Swelling   • Follow-up     pain med schedule   • Flu Vaccine        Here to follow up after change in pain medication.  She does continuously complain about pain but is lucid now.  There has been no more incidence with pills missing early.  She did not tolerate the meloxicam because it upset her stomach.      Noticed legs swelling over the past week.  Won't go down.  Itchy.  Apparently she has been sleeping in her recliner and not putting her feet up.    Started cipro eye drops.     Home -180/             The following portions of the patient's history were reviewed and updated as appropriate: allergies, current medications, past family history, past medical history, past social history, past surgical history and problem list.    Past Medical History:   Diagnosis Date   • Abdominal pain    • Abnormal blood chemistry    • Abnormal creatine kinase level    • Abnormal thyroid screen (blood)    • Adjustment disorder with mixed anxiety and depressed mood    • Anxiety disorder    • Arthritis    • Benign familial tremor    • BMI 27.0-27.9,adult    • Cervical stenosis of spine    • Chronic pain    • Constipation    • COPD (chronic obstructive pulmonary disease) (CMS/HCC)    • Depression    • Dermatitis    • Dermatophytosis    • Dizziness    • Dyspnea    • Edema    • Elevated hematocrit    • Encounter for long-term (current) use of medications    • Esophageal reflux    • Extremity atherosclerosis with intermittent claudication (CMS/HCC)    • Fatigue    • Generalized osteoarthritis    • Hyperlipidemia    • Hypertension    • Hypokalemia    • Hypothyroidism    • Impacted cerumen    • Insomnia    • Limb pain    • Limb swelling    • Loss of weight    • Mild renal insufficiency    • Mumps    • Muscle cramp    • Muscle weakness    • Nausea    • Osteoporosis    • Peripheral vertigo    • Seborrheic keratosis    • Statin  myopathy    • Tinnitus    • Tobacco use    • Tremor    • Varicella    • Vitamin B12 deficiency    • Vitamin D deficiency    • Weakness        Past Surgical History:   Procedure Laterality Date   • APPENDECTOMY  1959   • BLADDER SURGERY  1985    bladder repair   • CATARACT EXTRACTION     • COLONOSCOPY  2007   • HERNIA REPAIR      1995, 2000, 2005, 2010, 2/2012   • HYSTERECTOMY     • INGUINAL HERNIA REPAIR     • SHOULDER SURGERY  08/28/2013       Family History   Problem Relation Age of Onset   • Emphysema Mother    • COPD Mother    • Osteoporosis Mother    • Cancer Father        Social History     Socioeconomic History   • Marital status:      Spouse name: Not on file   • Number of children: Not on file   • Years of education: Not on file   • Highest education level: Not on file   Tobacco Use   • Smoking status: Current Every Day Smoker   • Smokeless tobacco: Current User   • Tobacco comment: smokes 1-2 PPD for 30 years (10/4/18)   Substance and Sexual Activity   • Alcohol use: No     Frequency: Never   • Drug use: No         Current Outpatient Medications:   •  docusate sodium (COLACE) 100 MG capsule, Take 1 capsule by mouth 2 (Two) Times a Day., Disp: , Rfl:   •  HYDROcodone-acetaminophen (NORCO) 5-325 MG per tablet, Take 1 tablet by mouth Every 6 (Six) Hours As Needed for Moderate Pain  or Severe Pain ., Disp: 120 tablet, Rfl: 0  •  metoprolol succinate XL (TOPROL XL) 50 MG 24 hr tablet, Take 1 tablet by mouth Daily., Disp: 90 tablet, Rfl: 3  •  pantoprazole (PROTONIX) 40 MG EC tablet, TAKE ONE TABLET BY MOUTH DAILY, Disp: 30 tablet, Rfl: 2  •  Psyllium 500 MG capsule, Take 3 capsules by mouth 2 (Two) Times a Day., Disp: , Rfl:     Review of Systems   Constitutional: Negative for chills, fatigue and fever.   HENT: Negative for congestion, rhinorrhea and sore throat.    Respiratory: Negative for cough and shortness of breath.    Cardiovascular: Positive for leg swelling. Negative for chest pain.  "  Gastrointestinal: Negative for abdominal pain.   Endocrine: Negative for polydipsia and polyuria.   Genitourinary: Negative for dysuria.   Musculoskeletal: Positive for arthralgias and back pain. Negative for myalgias.   Skin: Negative for rash.   Neurological: Negative for dizziness.   Hematological: Does not bruise/bleed easily.   Psychiatric/Behavioral: Negative for sleep disturbance.       Objective   Vitals:    10/27/20 1057   BP: 126/82   Pulse: 78   Temp: 97.1 °F (36.2 °C)   SpO2: 97%   Weight: 67.4 kg (148 lb 9.6 oz)   Height: 160 cm (63\")     Body mass index is 26.32 kg/m².  Physical Exam  Constitutional:       General: She is not in acute distress.     Appearance: Normal appearance. She is well-developed.   HENT:      Head: Normocephalic and atraumatic.      Right Ear: Tympanic membrane, ear canal and external ear normal.      Left Ear: Tympanic membrane, ear canal and external ear normal.      Mouth/Throat:      Mouth: Mucous membranes are moist.      Pharynx: Oropharynx is clear.   Eyes:      Conjunctiva/sclera: Conjunctivae normal.      Pupils: Pupils are equal, round, and reactive to light.   Neck:      Musculoskeletal: Neck supple.      Thyroid: No thyromegaly.   Cardiovascular:      Rate and Rhythm: Normal rate and regular rhythm.      Heart sounds: No murmur.   Pulmonary:      Effort: Pulmonary effort is normal.      Breath sounds: Decreased breath sounds present. No wheezing.   Abdominal:      General: Bowel sounds are normal.      Palpations: Abdomen is soft.      Tenderness: There is no abdominal tenderness.   Musculoskeletal: Normal range of motion.      Right lower leg: Edema present.      Left lower leg: Edema present.      Comments: 1-2+ edema bilateral lower extremities from mid calf down   Lymphadenopathy:      Cervical: No cervical adenopathy.   Skin:     General: Skin is warm and dry.   Neurological:      Mental Status: She is alert and oriented to person, place, and time. "   Psychiatric:         Mood and Affect: Mood normal.         Behavior: Behavior normal.           Assessment/Plan   Diagnoses and all orders for this visit:    1. Flu vaccine need (Primary)  -     Fluad Quad 65+ yrs ([id number])    2. Edema, unspecified type  -     TSH  -     CBC & Differential  -     Comprehensive Metabolic Panel  -     POC Urinalysis Dipstick, Automated  -     BNP    3. Essential hypertension    4. Other chronic pain  -     HYDROcodone-acetaminophen (NORCO) 5-325 MG per tablet; Take 1 tablet by mouth Every 6 (Six) Hours As Needed for Moderate Pain  or Severe Pain .  Dispense: 120 tablet; Refill: 0    5. Shortness of breath   -     BNP               Patient Instructions   Keep feet up.  Monitor BP.  Follow up pending lab results.

## 2020-11-23 NOTE — TELEPHONE ENCOUNTER
Pt requesting refill on norco  LOV 10/27/2020  UDS 06/29/2020  Med refill 10/27/2020  Agreement 09/11/2019  meds pend

## 2020-11-23 NOTE — TELEPHONE ENCOUNTER
Patient's daughter called in requesting a re-fill for    HYDROcodone-acetaminophen (NORCO) 5-325 MG per tablet    [facility]    Best call back # [phone]

## 2020-12-21 NOTE — TELEPHONE ENCOUNTER
Pt requesting refill on norco  Last fill 11/23/2020  Agreement 09/11/2020  uds 006/29/2020  LOV10/27/2020

## 2020-12-21 NOTE — TELEPHONE ENCOUNTER
Patients daughter called in to make sure the pharmacy requested a re-fill for the patient.    metoprolol succinate XL (TOPROL XL) 50 MG 24 hr tablet    HYDROcodone-acetaminophen (NORCO) 5-325 MG per tablet    pantoprazole (PROTONIX) 40 MG EC tablet    [facility] [address] [place] [address] call back # [phone]

## 2020-12-29 NOTE — TELEPHONE ENCOUNTER
PATIENT STATES: daughter called for her mom. she would like for home health to know her mom med records please advise     PATIENT CAN BE REACHED ON:[phone]

## 2020-12-29 NOTE — TELEPHONE ENCOUNTER
NANCY  Spoke with daughter Soni per verbal release.  She stated that pt will not let home health come in.  She stated that she spoke with [facility] and they may have a place for pt after the first of the year.  She stated that Dr. Kehrer and her have spoke about this in the past.  She stated that pt has stopped bating and oral care, daughter has taken over pts medication.  Pt stated that Rosita from [facility] will be calling sometime today for more information on pt.  Daughter is aware that Dr. Kehrer is out of the office until Monday and this information will be forwarded to her.

## 2021-01-01 ENCOUNTER — TELEPHONE (OUTPATIENT)
Dept: FAMILY MEDICINE CLINIC | Facility: CLINIC | Age: 86
End: 2021-01-01

## 2021-01-13 NOTE — TELEPHONE ENCOUNTER
HENRY SHILPI WANTED TO LET YOU KNOW HER MOTHER PASSED AWAY @ 5:15AM THIS MORNING. IF YOU WOULD LIKE TO CALL HER PHONE [phone]